# Patient Record
Sex: FEMALE | Race: WHITE | NOT HISPANIC OR LATINO | Employment: OTHER | URBAN - METROPOLITAN AREA
[De-identification: names, ages, dates, MRNs, and addresses within clinical notes are randomized per-mention and may not be internally consistent; named-entity substitution may affect disease eponyms.]

---

## 2022-05-12 ENCOUNTER — OFFICE VISIT (OUTPATIENT)
Dept: OBGYN CLINIC | Facility: CLINIC | Age: 72
End: 2022-05-12
Payer: MEDICARE

## 2022-05-12 VITALS
SYSTOLIC BLOOD PRESSURE: 104 MMHG | DIASTOLIC BLOOD PRESSURE: 64 MMHG | WEIGHT: 108 LBS | HEIGHT: 60 IN | BODY MASS INDEX: 21.2 KG/M2

## 2022-05-12 DIAGNOSIS — Z12.4 ENCOUNTER FOR PAPANICOLAOU SMEAR FOR CERVICAL CANCER SCREENING: ICD-10-CM

## 2022-05-12 DIAGNOSIS — B37.3 VAGINAL YEAST INFECTION: ICD-10-CM

## 2022-05-12 DIAGNOSIS — N89.8 VAGINAL DRYNESS: ICD-10-CM

## 2022-05-12 DIAGNOSIS — Z01.419 ENCOUNTER FOR GYNECOLOGICAL EXAMINATION WITHOUT ABNORMAL FINDING: Primary | ICD-10-CM

## 2022-05-12 DIAGNOSIS — Z12.31 ENCOUNTER FOR SCREENING MAMMOGRAM FOR BREAST CANCER: ICD-10-CM

## 2022-05-12 PROCEDURE — G0101 CA SCREEN;PELVIC/BREAST EXAM: HCPCS | Performed by: NURSE PRACTITIONER

## 2022-05-12 PROCEDURE — G0145 SCR C/V CYTO,THINLAYER,RESCR: HCPCS | Performed by: NURSE PRACTITIONER

## 2022-05-12 RX ORDER — FLUTICASONE PROPIONATE 50 MCG
SPRAY, SUSPENSION (ML) NASAL
COMMUNITY
Start: 2022-04-28

## 2022-05-12 RX ORDER — GABAPENTIN 100 MG/1
200 CAPSULE ORAL 4 TIMES DAILY
COMMUNITY
Start: 2022-04-22

## 2022-05-12 RX ORDER — FLUCONAZOLE 150 MG/1
150 TABLET ORAL ONCE
Qty: 2 TABLET | Refills: 1 | Status: SHIPPED | OUTPATIENT
Start: 2022-05-12 | End: 2022-05-12

## 2022-05-12 RX ORDER — VITAMIN B COMPLEX
1 CAPSULE ORAL DAILY
COMMUNITY

## 2022-05-12 RX ORDER — ERGOCALCIFEROL 1.25 MG/1
50000 CAPSULE ORAL
COMMUNITY

## 2022-05-12 RX ORDER — OMEPRAZOLE 40 MG/1
CAPSULE, DELAYED RELEASE ORAL
COMMUNITY
Start: 2022-05-01

## 2022-05-12 RX ORDER — MELOXICAM 15 MG/1
TABLET ORAL
COMMUNITY
Start: 2022-05-09

## 2022-05-12 RX ORDER — ZOLPIDEM TARTRATE 5 MG/1
TABLET ORAL
COMMUNITY
Start: 2022-05-02

## 2022-05-12 RX ORDER — ATORVASTATIN CALCIUM 40 MG/1
TABLET, FILM COATED ORAL
COMMUNITY
Start: 2022-03-26

## 2022-05-12 RX ORDER — DENOSUMAB 60 MG/ML
1 INJECTION SUBCUTANEOUS
COMMUNITY

## 2022-05-12 RX ORDER — POLYETHYLENE GLYCOL 400 AND PROPYLENE GLYCOL 4; 3 MG/ML; MG/ML
1 SOLUTION/ DROPS OPHTHALMIC 3 TIMES DAILY
COMMUNITY

## 2022-05-12 NOTE — PATIENT INSTRUCTIONS
Open to air at bedtime  Cotton underwear    Wear looser fitting clothing  Get out of exercise clothes and bathing suits ASAP  Avoid bathroom wipes, feminine washes/scented soaps  Wash with cetaphil cleanser  Watch sugar and carb intake  Refrain from sexual activity while on treatment      Diflucan 1 now repeat in 3 days if needed  Coconut oil as lubricant and moisturizer  Aquaphor healing ointment externally if needed  PAP done

## 2022-05-12 NOTE — PROGRESS NOTES
Assessment/Plan:  Open to air at bedtime  Cotton underwear    Wear looser fitting clothing  Get out of exercise clothes and bathing suits ASAP  Avoid bathroom wipes, feminine washes/scented soaps  Wash with cetaphil cleanser  Watch sugar and carb intake  Refrain from sexual activity while on treatment  Diflucan 1 now repeat in 3 days if needed  Coconut oil as lubricant and moisturizer  Aquaphor healing ointment externally if needed  PAP done        Diagnoses and all orders for this visit:    Encounter for gynecological examination without abnormal finding  -     Liquid-based pap, screening    Encounter for screening mammogram for breast cancer  -     Mammo screening bilateral w 3d & cad; Future    Vaginal yeast infection  -     fluconazole (DIFLUCAN) 150 mg tablet; Take 1 tablet (150 mg total) by mouth once for 1 dose Repeat in 3 days if needed    Vaginal dryness  Comments:  discussed coconut oil vs vaginal estrogen R/b discussed     Encounter for Papanicolaou smear for cervical cancer screening  -     Liquid-based pap, screening    Other orders  -     MULTIPLE VITAMIN PO; Take 1 tablet by mouth daily  -     omeprazole (PriLOSEC) 40 MG capsule  -     atorvastatin (LIPITOR) 40 mg tablet  -     Cholecalciferol 125 MCG (5000 UT) capsule; Take 1 capsule by mouth in the morning   -     B Complex CAPS; Take 1 capsule by mouth in the morning   -     gabapentin (NEURONTIN) 100 mg capsule; Take 200 mg by mouth in the morning and 200 mg at noon and 200 mg in the evening and 200 mg before bedtime  -     zolpidem (AMBIEN) 5 mg tablet; TAKE 1 TABLET BY MOUTH EVERY DAY AT BEDTIME AS NEEDED FOR 30 DAYS  -     polyethylene glycol-propylene glycol (Systane) 0 4-0 3 %; Apply 1 drop to eye Three times a day  -     meloxicam (MOBIC) 15 mg tablet; TAKE 1 TABLET ORALLY EVERY DAY TAKE WITH FOOD  -     fluticasone (FLONASE) 50 mcg/act nasal spray; USE 2 SPRAYS NASALLY ONCE A DAY 90  -     denosumab (Prolia) 60 mg/mL;  Inject 1 mg under the skin  -     ergocalciferol (VITAMIN D2) 50,000 units; Take 50,000 Units by mouth          Subjective:      Patient ID: Gilberto Mazariegos is a 70 y o  female   here for annual gyn  Doing well No Concerns PAP  neg/neg No H/o abn pap FH colon cancer FH breast cancer Denies vaginal bleeding Does note vaginal dryness  Vaginal irritation thought she had yeast infection used vagisil with burning then monistat and burned SA issue with dryness  Denies abdominal or pelvic pain Bowel and bladder are normal Former OR nurse Doing consulting opening [address]      The following portions of the patient's history were reviewed and updated as appropriate: allergies, current medications, past family history, past medical history, past social history, past surgical history and problem list     Review of Systems   Constitutional: Negative for fatigue and unexpected weight change  Gastrointestinal: Negative for abdominal distention, abdominal pain, constipation and diarrhea  Genitourinary: Negative for difficulty urinating, dyspareunia, dysuria, frequency, genital sores, pelvic pain, urgency, vaginal bleeding, vaginal discharge and vaginal pain  Psychiatric/Behavioral: Negative  Negative for dysphoric mood  The patient is not nervous/anxious  Objective:      /64 (BP Location: Left arm, Patient Position: Sitting, Cuff Size: Standard)   Ht 5' (1 524 m)   Wt 49 kg (108 lb)   BMI 21 09 kg/m²          Physical Exam  Vitals and nursing note reviewed  Constitutional:       General: She is not in acute distress  Appearance: Normal appearance  HENT:      Head: Normocephalic and atraumatic  Pulmonary:      Effort: Pulmonary effort is normal    Chest:   Breasts: Breasts are symmetrical       Right: Normal  No mass, nipple discharge, skin change, tenderness or axillary adenopathy  Left: Normal  No mass, nipple discharge, skin change, tenderness or axillary adenopathy         Abdominal: General: There is no distension  Palpations: Abdomen is soft  Tenderness: There is no abdominal tenderness  There is no guarding or rebound  Genitourinary:     General: Normal vulva  Exam position: Lithotomy position  Labia:         Right: No rash, tenderness, lesion or injury  Left: No rash, tenderness, lesion or injury  Urethra: No prolapse, urethral pain, urethral swelling or urethral lesion  Vagina: Normal  No erythema or lesions  Cervix: No cervical motion tenderness, discharge, lesion or cervical bleeding  Uterus: Normal        Adnexa: Right adnexa normal and left adnexa normal         Right: No mass or tenderness  Left: No mass or tenderness  Rectum: No mass or external hemorrhoid  Comments: PAP from cervix  Musculoskeletal:         General: Normal range of motion  Lymphadenopathy:      Upper Body:      Right upper body: No axillary adenopathy  Left upper body: No axillary adenopathy  Lower Body: No right inguinal adenopathy  No left inguinal adenopathy  Skin:     General: Skin is warm and dry  Neurological:      Mental Status: She is alert and oriented to person, place, and time  Psychiatric:         Mood and Affect: Mood normal          Behavior: Behavior normal          Thought Content:  Thought content normal          Judgment: Judgment normal

## 2022-05-20 LAB
LAB AP GYN PRIMARY INTERPRETATION: NORMAL
Lab: NORMAL

## 2024-03-01 ENCOUNTER — HOSPITAL ENCOUNTER (OUTPATIENT)
Dept: HOSPITAL 99 - HWLAB | Age: 74
End: 2024-03-01
Payer: MEDICARE

## 2024-03-01 DIAGNOSIS — M31.6: Primary | ICD-10-CM

## 2024-03-01 LAB
ALBUMIN SERPL-MCNC: 4.2 G/DL (ref 3.5–5)
ALP SERPL-CCNC: 56 U/L (ref 38–126)
ALT SERPL-CCNC: 54 U/L (ref 0–35)
AST SERPL-CCNC: 42 U/L (ref 14–36)
BUN SERPL-MCNC: 36 MG/DL (ref 7–17)
CALCIUM SERPL-MCNC: 9.4 MG/DL (ref 8.4–10.2)
CHLORIDE SERPL-SCNC: 103 MMOL/L (ref 98–107)
CO2 SERPL-SCNC: 28 MMOL/L (ref 22–30)
CRP SERPL-MCNC: 5.7 MG/L (ref 0–10)
EGFR: > 60
ERYTHROCYTE [DISTWIDTH] IN BLOOD BY AUTOMATED COUNT: 13 % (ref 11.5–14.5)
GLUCOSE SERPL-MCNC: 87 MG/DL (ref 70–99)
HCT VFR BLD AUTO: 38.5 % (ref 37–47)
HGB BLD-MCNC: 12.8 G/DL (ref 12–16)
MCHC RBC AUTO-ENTMCNC: 33.2 G/DL (ref 33–37)
MCV RBC AUTO: 90.8 FL (ref 81–99)
NRBC BLD AUTO-RTO: 0 %
PLATELET # BLD AUTO: 258 10^3/UL (ref 130–400)
POTASSIUM SERPL-SCNC: 4.9 MMOL/L (ref 3.5–5.1)
PROT SERPL-MCNC: 7.1 G/DL (ref 6.3–8.2)
SODIUM SERPL-SCNC: 139 MMOL/L (ref 135–145)

## 2024-03-11 ENCOUNTER — HOSPITAL ENCOUNTER (OUTPATIENT)
Dept: HOSPITAL 99 - HWRAD | Age: 74
End: 2024-03-11
Payer: MEDICARE

## 2024-03-11 DIAGNOSIS — R10.32: Primary | ICD-10-CM

## 2024-03-12 ENCOUNTER — PRE-ADMISSION TESTING (OUTPATIENT)
Dept: PREADMISSION TESTING | Age: 74
End: 2024-03-12
Payer: MEDICARE

## 2024-03-12 VITALS — BODY MASS INDEX: 20.81 KG/M2 | HEIGHT: 60 IN | WEIGHT: 106 LBS

## 2024-03-12 RX ORDER — DEXTROMETHORPHAN HYDROBROMIDE, GUAIFENESIN 5; 100 MG/5ML; MG/5ML
650 LIQUID ORAL EVERY 8 HOURS PRN
COMMUNITY

## 2024-03-12 RX ORDER — FLUTICASONE PROPIONATE 50 MCG
1 SPRAY, SUSPENSION (ML) NASAL EVERY MORNING
COMMUNITY

## 2024-03-12 RX ORDER — ZOLPIDEM TARTRATE 5 MG/1
5 TABLET ORAL NIGHTLY
COMMUNITY

## 2024-03-12 RX ORDER — ATORVASTATIN CALCIUM 40 MG/1
40 TABLET, FILM COATED ORAL NIGHTLY
COMMUNITY

## 2024-03-12 RX ORDER — DENOSUMAB 60 MG/ML
60 INJECTION SUBCUTANEOUS ONCE
COMMUNITY

## 2024-03-12 RX ORDER — MINOXIDIL 2.5 MG/1
1.25 TABLET ORAL NIGHTLY
COMMUNITY

## 2024-03-12 RX ORDER — OMEPRAZOLE 20 MG/1
40 CAPSULE, DELAYED RELEASE ORAL
COMMUNITY

## 2024-03-12 RX ORDER — GABAPENTIN 300 MG/1
300 CAPSULE ORAL 3 TIMES DAILY
COMMUNITY

## 2024-03-12 ASSESSMENT — PAIN SCALES - GENERAL: PAINLEVEL: 3

## 2024-03-12 NOTE — PRE-PROCEDURE INSTRUCTIONS
1.       You will be called between 1pm-5pm one business day before your procedure to tell you where and when to report. If you do not receive a phone call by 6pm, please call the Operating Room at [phone].    2.        Please report to Surgery Registration on the day of your procedure. You can park in the [facility], enter the front doors of the new Pavilion, and take the [place] elevators to the second floor. Follow signs to Surgery Registration.       3. Please follow the following fasting guidelines: Follow Dr. Merino's instructions on eating and drinking after midnight    No solid food EIGHT HOURS prior to arrival time on day of surgery.  6 ounces of clear liquids, meaning water or PLAIN black coffee WITHOUT any milk, cream, sugar, or sweetener are permitted up to TWO HOURS prior to arrival at the hospital.    4. Please take ONLY the following medications with a sip of water on the morning of your procedure: (populate names and/or NONE) gabapentin, prilosec, nasal sprays and eye drops        No NSAIDs, Supplements, Vitamins from 3/14/24 until after surgery.              May take tylenol if needed.    5. Other Instructions: You may brush your teeth the morning of the procedure. Rinse and spit, do not swallow.  Bring a list of your medications with dosages.  Use surgical wash as directed. CHG Scrub the night before and the morning of the procedure.    6. If you develop a cold, cough, fever, rash, or other symptom prior to the day of the procedure, please report it to your physician immediately.    7. If you need to cancel the procedure for any reason, please contact your physician.    8. Make arrangements to have safe transportation home accompanied by a responsible adult. If you have not arranged safe transportation home, your surgery will be cancelled. Safe transportation may include private vehicle, ride-share service, taxi and public transportation when accompanied by a responsible adult who will assist you  home. A responsible adult is someone known to you and does not include the taxi, ride-share or public transit drive transporting you.    9.  If it is medically necessary for you to have a longer stay, you will be informed as soon as the decision is made.    10. Only bring essential items to the hospital.  Do not wear or bring anything of value to the hospital including jewelry of any kind, money, or wallet. Do not wear make-up or contact lenses.  DO NOT BRING MEDICATIONS FROM HOME unless instructed to do so. DO bring your hearing aids, glasses, and a case    11. No lotion, creams, powders, or oils on skin the morning of procedure     12. Dress in comfortable clothes.    13.  If instructed, please bring a copy of your Advanced Directive (Living Will/Durable Power of ) on the day of your procedure.     14. Ensuring your safety at all times is a very important part of our [facility] Culture of Safety. After having surgery and sedation, you are at risk for falling and balance issues. Although you may feel awake, the effects of the medication can last up to 24 hours after anesthesia. If you need to use the bathroom during your recovery period, nursing staff will escort you there and stay with you to ensure your safety.    15. Refrain from drinking alcohol and smoking cigarettes for 24 hours prior to surgery.    16. Shower with antibacterial soap (Dial) the night before and morning of your procedure.  If your procedure indicates the need for CHG antiseptic wash (Bactoshield or Hibiclens), please use this instead and follow instructions as discussed at the time of your Pre-Admission Testing phone interview or visit.    Above instructions reviewed with patient and patient acknowledges understanding.    Form explained by: Niharika Goodrich RN

## 2024-03-13 ENCOUNTER — APPOINTMENT (OUTPATIENT)
Dept: LAB | Facility: HOSPITAL | Age: 74
End: 2024-03-13
Attending: ORTHOPAEDIC SURGERY
Payer: MEDICARE

## 2024-03-13 ENCOUNTER — PRE-ADMISSION TESTING (OUTPATIENT)
Dept: PREADMISSION TESTING | Facility: HOSPITAL | Age: 74
End: 2024-03-13
Attending: ORTHOPAEDIC SURGERY
Payer: MEDICARE

## 2024-03-13 ENCOUNTER — TRANSCRIBE ORDERS (OUTPATIENT)
Dept: REGISTRATION | Facility: HOSPITAL | Age: 74
End: 2024-03-13

## 2024-03-13 ENCOUNTER — HOSPITAL ENCOUNTER (OUTPATIENT)
Dept: CARDIOLOGY | Facility: HOSPITAL | Age: 74
Discharge: HOME | End: 2024-03-13
Attending: ORTHOPAEDIC SURGERY
Payer: MEDICARE

## 2024-03-13 VITALS
SYSTOLIC BLOOD PRESSURE: 120 MMHG | OXYGEN SATURATION: 98 % | BODY MASS INDEX: 20.46 KG/M2 | HEIGHT: 60 IN | DIASTOLIC BLOOD PRESSURE: 75 MMHG | TEMPERATURE: 98.1 F | RESPIRATION RATE: 18 BRPM | HEART RATE: 77 BPM | WEIGHT: 104.2 LBS

## 2024-03-13 DIAGNOSIS — M31.6 TEMPORAL ARTERITIS (CMS/HCC): ICD-10-CM

## 2024-03-13 DIAGNOSIS — T84.84XD PAIN DUE TO INTERNAL ORTHOPEDIC PROSTHETIC DEVICES, IMPLANTS AND GRAFTS, SUBSEQUENT ENCOUNTER: ICD-10-CM

## 2024-03-13 DIAGNOSIS — K21.9 GASTROESOPHAGEAL REFLUX DISEASE WITHOUT ESOPHAGITIS: ICD-10-CM

## 2024-03-13 DIAGNOSIS — M19.011 OSTEOARTHRITIS OF RIGHT SHOULDER, UNSPECIFIED OSTEOARTHRITIS TYPE: ICD-10-CM

## 2024-03-13 DIAGNOSIS — E78.49 OTHER HYPERLIPIDEMIA: ICD-10-CM

## 2024-03-13 DIAGNOSIS — Z01.818 PREOP TESTING: Primary | ICD-10-CM

## 2024-03-13 DIAGNOSIS — T84.84XD PAIN DUE TO INTERNAL ORTHOPEDIC PROSTHETIC DEVICES, IMPLANTS AND GRAFTS, SUBSEQUENT ENCOUNTER: Primary | ICD-10-CM

## 2024-03-13 LAB
ANION GAP SERPL CALC-SCNC: 7 MEQ/L (ref 3–15)
BUN SERPL-MCNC: 26 MG/DL (ref 7–25)
CALCIUM SERPL-MCNC: 8.7 MG/DL (ref 8.6–10.3)
CHLORIDE SERPL-SCNC: 105 MEQ/L (ref 98–107)
CO2 SERPL-SCNC: 27 MEQ/L (ref 21–31)
CREAT SERPL-MCNC: 0.7 MG/DL (ref 0.6–1.2)
EGFRCR SERPLBLD CKD-EPI 2021: >60 ML/MIN/1.73M*2
ERYTHROCYTE [DISTWIDTH] IN BLOOD BY AUTOMATED COUNT: 12.5 % (ref 11.7–14.4)
GLUCOSE SERPL-MCNC: 84 MG/DL (ref 70–99)
HCT VFR BLD AUTO: 39.4 % (ref 35–45)
HGB BLD-MCNC: 12.8 G/DL (ref 11.8–15.7)
MCH RBC QN AUTO: 29.8 PG (ref 28–33.2)
MCHC RBC AUTO-ENTMCNC: 32.5 G/DL (ref 32.2–35.5)
MCV RBC AUTO: 91.8 FL (ref 83–98)
PDW BLD AUTO: 9.4 FL (ref 9.4–12.3)
PLATELET # BLD AUTO: 202 K/UL (ref 150–369)
POTASSIUM SERPL-SCNC: 4 MEQ/L (ref 3.5–5.1)
RBC # BLD AUTO: 4.29 M/UL (ref 3.93–5.22)
SODIUM SERPL-SCNC: 139 MEQ/L (ref 136–145)
WBC # BLD AUTO: 6 K/UL (ref 3.8–10.5)

## 2024-03-13 PROCEDURE — 36415 COLL VENOUS BLD VENIPUNCTURE: CPT

## 2024-03-13 PROCEDURE — 99204 OFFICE O/P NEW MOD 45 MIN: CPT | Performed by: HOSPITALIST

## 2024-03-13 PROCEDURE — 80048 BASIC METABOLIC PNL TOTAL CA: CPT

## 2024-03-13 PROCEDURE — 85027 COMPLETE CBC AUTOMATED: CPT

## 2024-03-13 RX ORDER — LORAZEPAM 0.5 MG/1
0.5 TABLET ORAL EVERY 6 HOURS PRN
COMMUNITY

## 2024-03-13 RX ORDER — IBUPROFEN 200 MG
800 TABLET ORAL EVERY 6 HOURS PRN
COMMUNITY

## 2024-03-13 RX ORDER — ONDANSETRON 4 MG/1
4 TABLET, ORALLY DISINTEGRATING ORAL AS NEEDED
COMMUNITY

## 2024-03-13 RX ORDER — ACETAMINOPHEN 500 MG
5000 TABLET ORAL 3 TIMES WEEKLY
COMMUNITY

## 2024-03-13 RX ORDER — IPRATROPIUM BROMIDE 42 UG/1
2 SPRAY, METERED NASAL 4 TIMES DAILY PRN
COMMUNITY
Start: 2024-02-29

## 2024-03-13 ASSESSMENT — PAIN SCALES - GENERAL: PAINLEVEL: 5

## 2024-03-13 NOTE — H&P (VIEW-ONLY)
"   [facility] Medicine Service -  Pre-Operative Consultation       Patient Name: Fiona Lucia  Referring Surgeon: Dr. Merino    Reason for Referral: Pre-Operative Evaluation  Surgical Procedure: R shoulder revision  Operative Date: 3/21/24    PCP: Josue Pepper MD        HISTORY OF PRESENT ILLNESS      Fiona Lucia is a 73 y.o. female presenting today to the [facility] at [place] for pre-operative evaluation prior to planned surgery.    Ms. Lucia originally had her R shoulder replaced in 2022. She has since developed a \"crunching sound\" in her shoulder joint with range of motion associated with RUE numbness/tingling. She was evaluated by orthopedic surgery and is planning on revision surgery.    Overall, the patient has been feeling in good health. She denies current or recent CP, SOB, palpitations, syncope, fever, cough, abd pain, vomiting, diarrhea, BRBPR, melena, hematemesis, dysuria, hematuria, headache, vision change, vertigo, light headedness, or additional complaint.       Functionally, the patient is able to ascend a flight of stairs with no dyspnea or chest pain, and can walk at least a city block.   She denies ever having any anginal symptoms.  Functional capacity is  > 4 METS.    The patient denies, on specific questioning, the following:  No history of MI, arrhythmia, valvular heart disease or CHF.  No history of TROY.  No history of DVT/PE.  No history of COPD.  No history of CVA or TIA.  No history of DM or insulin use.   No history of CKD.   No history of liver disease or cirrhosis.  No history of bleeding disorder.  No history of difficult airway/difficult intubation.  No history of Malignant hyperthermia.  No history of adverse reaction to anesthesia in the past.      PAST MEDICAL AND SURGICAL HISTORY      Past Medical History:   Diagnosis Date   • Acute headache     hx of   • Arthritis of unspecified temporomandibular joint     hx of   • " Dry skin     hx of   • GERD (gastroesophageal reflux disease)     hx of   • Hearing decreased, bilateral     hx of   • Irritable bowel syndrome with constipation     hx of   • Lipid disorder    • Multiple skin tears     hx of   • Osteoporosis        Past Surgical History:   Procedure Laterality Date   • ABDOMINAL SURGERY     • BREAST SURGERY      lift   • EYE SURGERY     • EYE SURGERY      eye duct surgery   • FOOT FRACTURE SURGERY      hx of bunectomy   • FRACTURE SURGERY Left     wrist   • SHOULDER SURGERY Right     revision   • TEMPORAL ARTERY BIOPSY / LIGATION      hx of   • ULNAR NERVE REPAIR Right     hx of       MEDICATIONS        Current Outpatient Medications:   •  CALCIUM-MAGNESIUM-ZINC ORAL, Take 1 tablet by mouth nightly., Disp: , Rfl:   •  cholecalciferol, vitamin D3, (VITAMIN D3) 5,000 unit (125 mcg) tablet, Take 5,000 Units by mouth 3 (three) times a week (Mon, Wed, Fri). STOPPED APPROXIMATELY 02/28/2024, Disp: , Rfl:   •  FIBER, DEXTRIN, ORAL, Take by mouth nightly. 2 GUMMIES NIGHTLY, Disp: , Rfl:   •  ibuprofen (MOTRIN) 200 mg tablet, Take 800 mg by mouth every 6 (six) hours as needed., Disp: , Rfl:   •  LORazepam (ATIVAN) 0.5 mg tablet, Take 0.5 mg by mouth every 6 (six) hours as needed for anxiety. TAKES 0.5 MG-1 MG AS NEEDED, Disp: , Rfl:   •  mv,ronald,iron,mn/folic acid/chol (HAIR-SKIN-NAILS, PABA, ORAL), Take by mouth. 2 GUMMIES IN THE AM DAILY. STOPPED 03/11/2024 FOR SURGERY, Disp: , Rfl:   •  ondansetron ODT (ZOFRAN-ODT) 4 mg disintegrating tablet, Take 4 mg by mouth as needed for nausea or vomiting., Disp: , Rfl:   •  propylene glycol/peg 400/PF (SYSTANE, PF, OPHT), Administer into both eyes as needed., Disp: , Rfl:   •  acetaminophen (TYLENOL 8 HOUR) 650 mg 8 hr tablet, Take 650 mg by mouth every 8 (eight) hours as needed., Disp: , Rfl:   •  atorvastatin (LIPITOR) 40 mg tablet, Take 40 mg by mouth nightly., Disp: , Rfl:   •  denosumab (PROLIA) 60 mg/mL syringe, Inject 60 mg under the skin  once. Last dose Dec. 2023, Disp: , Rfl:   •  fluticasone propionate (FLONASE) 50 mcg/actuation nasal spray, Administer 1 spray into each nostril every morning., Disp: , Rfl:   •  gabapentin (NEURONTIN) 300 mg capsule, Take 300 mg by mouth 3 (three) times a day., Disp: , Rfl:   •  ipratropium (ATROVENT) 42 mcg (0.06 %) nasal spray, Administer 2 sprays into each nostril 4 (four) times a day as needed., Disp: , Rfl:   •  minoxidiL (LONITEN) 2.5 mg tablet, Take 1.25 mg by mouth nightly. TAKES FOR HAIR LOSS, Disp: , Rfl:   •  omeprazole (PriLOSEC) 20 mg capsule, Take 40 mg by mouth daily before breakfast., Disp: , Rfl:   •  zolpidem (AMBIEN) 5 mg tablet, Take 5 mg by mouth nightly., Disp: , Rfl:     ALLERGIES      Bitterness reducing agent [flavoring agent (bulk)] and Erythromycin    FAMILY HISTORY      family history includes Aneurysm in her biological brother; Cancer in her biological sister; Colon cancer in her biological father; Early death in her biological brother; Heart disease in her biological father and biological mother; No Known Problems in her biological sister and biological sister; Stroke in her biological mother.    SOCIAL HISTORY      Social History     Tobacco Use   • Smoking status: Never   • Smokeless tobacco: Never   Vaping Use   • Vaping Use: Never used   Substance Use Topics   • Alcohol use: Yes     Comment: occassional   • Drug use: Never       REVIEW OF SYSTEMS      All other systems reviewed and negative except as noted in HPI    PHYSICAL EXAMINATION      Visit Vitals  /75 (BP Location: Left upper arm, Patient Position: Sitting)   Pulse 77   Temp 36.7 °C (98.1 °F) (Oral)   Resp 18   Ht 1.524 m (5')   Wt 47.3 kg (104 lb 3.2 oz)   SpO2 98%   BMI 20.35 kg/m²     Body mass index is 20.35 kg/m².    Physical Exam  General: nontoxic appearing, no acute distress  HEENT: Moist membranes, PERRL, anicteric sclera   Neck: supple, no JVD  Cardiac: RRR, +S1/S2, no murmur, no rub   Lungs: clear  bilaterally, no wheezing/rales/rhonchi  Extremities: no edema, distal perfusion intact  MSK: decreased ROM R shoulder. no joint effusion or erythema  Neuro: AAOx3, nonfocal.       LABS / EKG        Labs  Today's labs reviewed.     Lab Results   Component Value Date     03/13/2024    K 4.0 03/13/2024     03/13/2024    BUN 26 (H) 03/13/2024    CREATININE 0.7 03/13/2024    WBC 6.00 03/13/2024    HGB 12.8 03/13/2024    HCT 39.4 03/13/2024     03/13/2024         ECG   Reviewed. NSR 74 bpm. Normal.    ASSESSMENT AND PLAN         Osteoarthritis of right shoulder  R shoulder revision planned for 3/21/24.  See below for statement in regards to perioperative risk.   Defer recommendations on NSAID use to the patient's surgeon.  Recommend DVT prophylaxis at the discretion of the surgeon.   Incentive spirometry and early ambulation post op.  Perioperative antibiotics defer to surgery.  Post op bowel regimen.  If present, recommend removal of riggs catheter as early as possible to prevent infection.  Monitor CBC, BMP, and volume status in the perioperative period.      Other hyperlipidemia  On statin    Gastroesophageal reflux disease without esophagitis  Continue PPI    Temporal arteritis (CMS/HCC)  Treated in 2020.  She states she has not been on chronic steroids for more than a year.         In regards to perioperative cardiac risk:  The patient denies any history of ischemic heart disease, denies any history of CHF, denies any history of CVA, is not on pre-operative treatment with insulin, and does not have a pre-operative creatinine > 2 mg/dL.   The Revised Cardiac Risk Index (RCRI) = 0 for this patient which indicates a 0.4% risk of major adverse cardiac event in the perioperative period for this intermediate risk procedure.   The patient had a stress echo this past August which was negative for ischemia or structural abnormality.    Further comments:  STOP-BANG screening for obstructive sleep apnea = 1,  which indicates the patient is low risk for having underlying TROY.          Please do not hesitate to contact [facility] during the upcoming hospitalization with any questions or concerns.     Tyler Ojeda, DO  3/14/2024

## 2024-03-13 NOTE — ASSESSMENT & PLAN NOTE
R shoulder revision planned for 3/21/24.  See below for statement in regards to perioperative risk.   Defer recommendations on NSAID use to the patient's surgeon.  Recommend DVT prophylaxis at the discretion of the surgeon.   Incentive spirometry and early ambulation post op.  Perioperative antibiotics defer to surgery.  Post op bowel regimen.  If present, recommend removal of riggs catheter as early as possible to prevent infection.  Monitor CBC, BMP, and volume status in the perioperative period.

## 2024-03-13 NOTE — PRE-PROCEDURE INSTRUCTIONS
1. You will be called between 1pm-5pm one business day before your procedure to tell you where and when to report. If you do not receive a phone call by 6pm, please call the Operating Room at [phone].    2. Please report to Surgery Registration on the day of your procedure. You can park in the [facility], enter the front doors of the new [facility], and take the [place] elevators to the second floor. Follow signs to Surgery Registration.         3. Please follow the following fasting guidelines:     No solid food EIGHT HOURS prior to arrival time on day of surgery.  6 ounces of clear liquids, meaning water or PLAIN black coffee WITHOUT any milk, cream, sugar, or sweetener are permitted up to TWO HOURS prior to arrival at the hospital.    4. Please take ONLY the following medications with a sip of water on the morning of your procedure: gabapentin and prilosec   Hold vitamins and herbals 7-1 days prior to surgery as per Dr Ojeda    5. Other Instructions: You may brush your teeth the morning of the procedure. Rinse and spit, do not swallow.  Bring a list of your medications with dosages.  Use surgical wash as directed.     6. If you develop a cold, cough, fever, rash, or other symptom prior to the day of the procedure, please report it to your physician immediately.    7. If you need to cancel the procedure for any reason, please contact your physician.    8. Make arrangements to have safe transportation home accompanied by a responsible adult. If you have not arranged safe transportation home, your surgery will be cancelled. Safe transportation may include private vehicle, ride-share service, taxi and public transportation when accompanied by a responsible adult who will assist you home. A responsible adult is someone known to you and does not include the taxi, ride-share or public transit drive transporting you.    9.  If it is medically necessary for you to have a longer stay, you will be informed as soon as  the decision is made.    10. Only bring essential items to the hospital.  Do not wear or bring anything of value to the hospital including jewelry of any kind, money, or wallet. Do not wear make-up or contact lenses.  DO NOT BRING MEDICATIONS FROM HOME unless instructed to do so. DO bring your hearing aids, glasses, and a case    11. No lotion, creams, powders, or oils on skin the morning of procedure     12. Dress in comfortable clothes.    13.  If instructed, please bring a copy of your Advanced Directive (Living Will/Durable Power of ) on the day of your procedure.     14. Ensuring your safety at all times is a very important part of our [facility] Culture of Safety. After having surgery and sedation, you are at risk for falling and balance issues. Although you may feel awake, the effects of the medication can last up to 24 hours after anesthesia. If you need to use the bathroom during your recovery period, nursing staff will escort you there and stay with you to ensure your safety.    15. Refrain from drinking alcohol and smoking cigarettes for 24 hours prior to surgery.    16. Shower with antibacterial soap (Dial) the night before and morning of your procedure.  If your procedure indicates the need for CHG antiseptic wash (Bactoshield or Hibiclens), please use this instead and follow instructions as discussed at the time of your Pre-Admission Testing phone interview or visit.  [facility]  Patient Education Preoperative Showers    Good hygiene, such as frequent handwashing and daily skin cleansing, promotes good health. Daily skin cleansing helps remove germs that may cause infections. The following instructions should be followed to help reduce germs on your skin prior to your surgical procedure.     • Bactoshield/Hibiclens CHG 4% is an antiseptic soap. The active ingredient is chlorhexidine gluconate. Do not use this product if you are allergic to chlorhexidine gluconate.  • The NIGHT before and the  MORNING of your procedure, shower or bathe with Bactoshield. This product should replace your regular soap used for cleansing most of your body surfaces. Bactoshield should not be used on your head or face; keep out of your eyes, ears and mouth.  • If you plan to wash your hair, do so with your regular shampoo. Then, rinse the hair and your body thoroughly to remove any shampoo residue.  • Wash your face with regular soap and water only.  • Wash your genital area with soap and water only.  • Thoroughly rinse your body with warm water from the neck down.  • Apply the minimum amount of Bactoshield to cover the skin. Use this product as you would any liquid soap. Leave this on for 2 minutes. NOTE- Bactoshield DOES NOT LATHER like normal soap.   • Wash the skin gently and rinse thoroughly with warm water. You do not need to scrub the skin to remove germs.  • Do not use regular soap after you have applied and rinsed the Bactoshield.  Change into clean clothes after each shower.   • Do not apply any lotions, powders, or perfumes to the body areas that have been cleansed with Bactoshield.  • No use of hair removal products or shaving at or near the surgical site 48 hours before your procedure. (72 hours for cardiac patients.)  • For those having perineal surgeries (i.e.: vaginal, rectal or cystoscopy) - please use Dial soap.  Above instructions reviewed with patient and patient acknowledges understanding.    Form explained by: Aden Beard

## 2024-03-13 NOTE — CONSULTS
"   [facility] Medicine Service -  Pre-Operative Consultation       Patient Name: Fiona Lucia  Referring Surgeon: Dr. Merino    Reason for Referral: Pre-Operative Evaluation  Surgical Procedure: R shoulder revision  Operative Date: 3/21/24    PCP: Josue Pepper MD        HISTORY OF PRESENT ILLNESS      Fiona Lucia is a 73 y.o. female presenting today to the [facility] at [place] for pre-operative evaluation prior to planned surgery.    Ms. Lucia originally had her R shoulder replaced in 2022. She has since developed a \"crunching sound\" in her shoulder joint with range of motion associated with RUE numbness/tingling. She was evaluated by orthopedic surgery and is planning on revision surgery.    Overall, the patient has been feeling in good health. She denies current or recent CP, SOB, palpitations, syncope, fever, cough, abd pain, vomiting, diarrhea, BRBPR, melena, hematemesis, dysuria, hematuria, headache, vision change, vertigo, light headedness, or additional complaint.       Functionally, the patient is able to ascend a flight of stairs with no dyspnea or chest pain, and can walk at least a city block.   She denies ever having any anginal symptoms.  Functional capacity is  > 4 METS.    The patient denies, on specific questioning, the following:  No history of MI, arrhythmia, valvular heart disease or CHF.  No history of TROY.  No history of DVT/PE.  No history of COPD.  No history of CVA or TIA.  No history of DM or insulin use.   No history of CKD.   No history of liver disease or cirrhosis.  No history of bleeding disorder.  No history of difficult airway/difficult intubation.  No history of Malignant hyperthermia.  No history of adverse reaction to anesthesia in the past.      PAST MEDICAL AND SURGICAL HISTORY      Past Medical History:   Diagnosis Date   • Acute headache     hx of   • Arthritis of unspecified temporomandibular joint     hx of   • " Dry skin     hx of   • GERD (gastroesophageal reflux disease)     hx of   • Hearing decreased, bilateral     hx of   • Irritable bowel syndrome with constipation     hx of   • Lipid disorder    • Multiple skin tears     hx of   • Osteoporosis        Past Surgical History:   Procedure Laterality Date   • ABDOMINAL SURGERY     • BREAST SURGERY      lift   • EYE SURGERY     • EYE SURGERY      eye duct surgery   • FOOT FRACTURE SURGERY      hx of bunectomy   • FRACTURE SURGERY Left     wrist   • SHOULDER SURGERY Right     revision   • TEMPORAL ARTERY BIOPSY / LIGATION      hx of   • ULNAR NERVE REPAIR Right     hx of       MEDICATIONS        Current Outpatient Medications:   •  CALCIUM-MAGNESIUM-ZINC ORAL, Take 1 tablet by mouth nightly., Disp: , Rfl:   •  cholecalciferol, vitamin D3, (VITAMIN D3) 5,000 unit (125 mcg) tablet, Take 5,000 Units by mouth 3 (three) times a week (Mon, Wed, Fri). STOPPED APPROXIMATELY 02/28/2024, Disp: , Rfl:   •  FIBER, DEXTRIN, ORAL, Take by mouth nightly. 2 GUMMIES NIGHTLY, Disp: , Rfl:   •  ibuprofen (MOTRIN) 200 mg tablet, Take 800 mg by mouth every 6 (six) hours as needed., Disp: , Rfl:   •  LORazepam (ATIVAN) 0.5 mg tablet, Take 0.5 mg by mouth every 6 (six) hours as needed for anxiety. TAKES 0.5 MG-1 MG AS NEEDED, Disp: , Rfl:   •  mv,ronald,iron,mn/folic acid/chol (HAIR-SKIN-NAILS, PABA, ORAL), Take by mouth. 2 GUMMIES IN THE AM DAILY. STOPPED 03/11/2024 FOR SURGERY, Disp: , Rfl:   •  ondansetron ODT (ZOFRAN-ODT) 4 mg disintegrating tablet, Take 4 mg by mouth as needed for nausea or vomiting., Disp: , Rfl:   •  propylene glycol/peg 400/PF (SYSTANE, PF, OPHT), Administer into both eyes as needed., Disp: , Rfl:   •  acetaminophen (TYLENOL 8 HOUR) 650 mg 8 hr tablet, Take 650 mg by mouth every 8 (eight) hours as needed., Disp: , Rfl:   •  atorvastatin (LIPITOR) 40 mg tablet, Take 40 mg by mouth nightly., Disp: , Rfl:   •  denosumab (PROLIA) 60 mg/mL syringe, Inject 60 mg under the skin  once. Last dose Dec. 2023, Disp: , Rfl:   •  fluticasone propionate (FLONASE) 50 mcg/actuation nasal spray, Administer 1 spray into each nostril every morning., Disp: , Rfl:   •  gabapentin (NEURONTIN) 300 mg capsule, Take 300 mg by mouth 3 (three) times a day., Disp: , Rfl:   •  ipratropium (ATROVENT) 42 mcg (0.06 %) nasal spray, Administer 2 sprays into each nostril 4 (four) times a day as needed., Disp: , Rfl:   •  minoxidiL (LONITEN) 2.5 mg tablet, Take 1.25 mg by mouth nightly. TAKES FOR HAIR LOSS, Disp: , Rfl:   •  omeprazole (PriLOSEC) 20 mg capsule, Take 40 mg by mouth daily before breakfast., Disp: , Rfl:   •  zolpidem (AMBIEN) 5 mg tablet, Take 5 mg by mouth nightly., Disp: , Rfl:     ALLERGIES      Bitterness reducing agent [flavoring agent (bulk)] and Erythromycin    FAMILY HISTORY      family history includes Aneurysm in her biological brother; Cancer in her biological sister; Colon cancer in her biological father; Early death in her biological brother; Heart disease in her biological father and biological mother; No Known Problems in her biological sister and biological sister; Stroke in her biological mother.    SOCIAL HISTORY      Social History     Tobacco Use   • Smoking status: Never   • Smokeless tobacco: Never   Vaping Use   • Vaping Use: Never used   Substance Use Topics   • Alcohol use: Yes     Comment: occassional   • Drug use: Never       REVIEW OF SYSTEMS      All other systems reviewed and negative except as noted in HPI    PHYSICAL EXAMINATION      Visit Vitals  /75 (BP Location: Left upper arm, Patient Position: Sitting)   Pulse 77   Temp 36.7 °C (98.1 °F) (Oral)   Resp 18   Ht 1.524 m (5')   Wt 47.3 kg (104 lb 3.2 oz)   SpO2 98%   BMI 20.35 kg/m²     Body mass index is 20.35 kg/m².    Physical Exam  General: nontoxic appearing, no acute distress  HEENT: Moist membranes, PERRL, anicteric sclera   Neck: supple, no JVD  Cardiac: RRR, +S1/S2, no murmur, no rub   Lungs: clear  bilaterally, no wheezing/rales/rhonchi  Extremities: no edema, distal perfusion intact  MSK: decreased ROM R shoulder. no joint effusion or erythema  Neuro: AAOx3, nonfocal.       LABS / EKG        Labs  Today's labs reviewed.     Lab Results   Component Value Date     03/13/2024    K 4.0 03/13/2024     03/13/2024    BUN 26 (H) 03/13/2024    CREATININE 0.7 03/13/2024    WBC 6.00 03/13/2024    HGB 12.8 03/13/2024    HCT 39.4 03/13/2024     03/13/2024         ECG   Reviewed. NSR 74 bpm. Normal.    ASSESSMENT AND PLAN         Osteoarthritis of right shoulder  R shoulder revision planned for 3/21/24.  See below for statement in regards to perioperative risk.   Defer recommendations on NSAID use to the patient's surgeon.  Recommend DVT prophylaxis at the discretion of the surgeon.   Incentive spirometry and early ambulation post op.  Perioperative antibiotics defer to surgery.  Post op bowel regimen.  If present, recommend removal of riggs catheter as early as possible to prevent infection.  Monitor CBC, BMP, and volume status in the perioperative period.      Other hyperlipidemia  On statin    Gastroesophageal reflux disease without esophagitis  Continue PPI    Temporal arteritis (CMS/HCC)  Treated in 2020.  She states she has not been on chronic steroids for more than a year.         In regards to perioperative cardiac risk:  The patient denies any history of ischemic heart disease, denies any history of CHF, denies any history of CVA, is not on pre-operative treatment with insulin, and does not have a pre-operative creatinine > 2 mg/dL.   The Revised Cardiac Risk Index (RCRI) = 0 for this patient which indicates a 0.4% risk of major adverse cardiac event in the perioperative period for this intermediate risk procedure.   The patient had a stress echo this past August which was negative for ischemia or structural abnormality.    Further comments:  STOP-BANG screening for obstructive sleep apnea = 1,  which indicates the patient is low risk for having underlying TROY.          Please do not hesitate to [place] [facility] [place] with any questions or concerns.     Tyler Ojeda, DO  3/14/2024

## 2024-03-18 NOTE — PRE-PROCEDURE INSTRUCTIONS
1. You will be called between 1pm-5pm one business day before your procedure to tell you where and when to report. If you do not receive a phone call by 6pm, please call the Operating Room at [phone].    2. Please report to Surgery Registration on the day of your procedure. You can park in the [facility], enter the front doors of the new Pavilion, and take the [facility] elevators to the second floor. Follow signs to Surgery Registration.         3. Please follow the following fasting guidelines:     No solid food EIGHT HOURS prior to arrival time on day of surgery.  6 ounces of clear liquids, meaning water or PLAIN black coffee WITHOUT any milk, cream, sugar, or sweetener are permitted up to TWO HOURS prior to arrival at the hospital.    4. Please take ONLY the following medications with a sip of water on the morning of your procedure: PER DR EASTON  GABAPENTIN AND PRILOSEC THE AM OF SURGERY.  HOLD VITAMINS AND HERBALS 7-10 DAYS PRIOR.    5. Other Instructions: You may brush your teeth the morning of the procedure. Rinse and spit, do not swallow.  Bring a list of your medications with dosages.  Use surgical wash as directed.     6. If you develop a cold, cough, fever, rash, or other symptom prior to the day of the procedure, please report it to your physician immediately.    7. If you need to cancel the procedure for any reason, please contact your physician.    8. Make arrangements to have safe transportation home accompanied by a responsible adult. If you have not arranged safe transportation home, your surgery will be cancelled. Safe transportation may include private vehicle, ride-share service, taxi and public transportation when accompanied by a responsible adult who will assist you home. A responsible adult is someone known to you and does not include the taxi, ride-share or public transit drive transporting you.    9.  If it is medically necessary for you to have a longer stay, you will be informed as  soon as the decision is made.    10. Only bring essential items to the hospital.  Do not wear or bring anything of value to the hospital including jewelry of any kind, money, or wallet. Do not wear make-up or contact lenses.  DO NOT BRING MEDICATIONS FROM HOME unless instructed to do so. DO bring your hearing aids, glasses, and a case    11. No lotion, creams, powders, or oils on skin the morning of procedure     12. Dress in comfortable clothes.    13.  If instructed, please bring a copy of your Advanced Directive (Living Will/Durable Power of ) on the day of your procedure.     14. Ensuring your safety at all times is a very important part of our [facility] Culture of Safety. After having surgery and sedation, you are at risk for falling and balance issues. Although you may feel awake, the effects of the medication can last up to 24 hours after anesthesia. If you need to use the bathroom during your recovery period, nursing staff will escort you there and stay with you to ensure your safety.    15. Refrain from drinking alcohol and smoking cigarettes for 24 hours prior to surgery.    16. [facility]  Patient Education Preoperative Showers    Good hygiene, such as frequent handwashing and daily skin cleansing, promotes good health. Daily skin cleansing helps remove germs that may cause infections. The following instructions should be followed to help reduce germs on your skin prior to your surgical procedure.     • Bactoshield/Hibiclens CHG 4% is an antiseptic soap. The active ingredient is chlorhexidine gluconate. Do not use this product if you are allergic to chlorhexidine gluconate.  • The NIGHT before and the MORNING of your procedure, shower or bathe with Bactoshield. This product should replace your regular soap used for cleansing most of your body surfaces. Bactoshield should not be used on your head or face; keep out of your eyes, ears and mouth.  • If you plan to wash your hair, do so with your  regular shampoo. Then, rinse the hair and your body thoroughly to remove any shampoo residue.  • Wash your face with regular soap and water only.  • Wash your genital area with soap and water only.  • Thoroughly rinse your body with warm water from the neck down.  • Apply the minimum amount of Bactoshield to cover the skin. Use this product as you would any liquid soap. Leave this on for 2 minutes. NOTE- Bactoshield DOES NOT LATHER like normal soap.   • Wash the skin gently and rinse thoroughly with warm water. You do not need to scrub the skin to remove germs.  • Do not use regular soap after you have applied and rinsed the Bactoshield.  Change into clean clothes after each shower.   • Do not apply any lotions, powders, or perfumes to the body areas that have been cleansed with Bactoshield.  • No use of hair removal products or shaving at or near the surgical site 48 hours before your procedure. (72 hours for cardiac patients.)  For those having perineal surgeries (i.e.: vaginal, rectal or cystoscopy) - please use Dial soap.  Above instructions reviewed with patient and patient acknowledges understanding.    Form explained by: Mahnaz Hartley RN

## 2024-03-21 ENCOUNTER — ANESTHESIA EVENT (OUTPATIENT)
Dept: OPERATING ROOM | Facility: HOSPITAL | Age: 74
Setting detail: HOSPITAL OUTPATIENT SURGERY
End: 2024-03-21
Payer: MEDICARE

## 2024-03-21 ENCOUNTER — HOSPITAL ENCOUNTER (OUTPATIENT)
Facility: HOSPITAL | Age: 74
Discharge: HOME | End: 2024-03-22
Attending: ORTHOPAEDIC SURGERY | Admitting: ORTHOPAEDIC SURGERY
Payer: MEDICARE

## 2024-03-21 ENCOUNTER — APPOINTMENT (OUTPATIENT)
Dept: RADIOLOGY | Facility: HOSPITAL | Age: 74
End: 2024-03-21
Attending: PHYSICIAN ASSISTANT
Payer: MEDICARE

## 2024-03-21 PROBLEM — Z96.611 STATUS POST REPLACEMENT OF RIGHT SHOULDER JOINT: Status: ACTIVE | Noted: 2024-03-21

## 2024-03-21 PROBLEM — G47.00 INSOMNIA: Status: ACTIVE | Noted: 2024-03-21

## 2024-03-21 PROCEDURE — 71000011 HC PACU PHASE 1 EA ADDL MIN: Performed by: ORTHOPAEDIC SURGERY

## 2024-03-21 PROCEDURE — C1713 ANCHOR/SCREW BN/BN,TIS/BN: HCPCS | Performed by: ORTHOPAEDIC SURGERY

## 2024-03-21 PROCEDURE — 63600000 HC DRUGS/DETAIL CODE: Mod: JZ | Performed by: NURSE PRACTITIONER

## 2024-03-21 PROCEDURE — 25000000 HC PHARMACY GENERAL: Performed by: NURSE ANESTHETIST, CERTIFIED REGISTERED

## 2024-03-21 PROCEDURE — 0RPJ0J6 REMOVAL OF SYNTHETIC SUBSTITUTE FROM RIGHT SHOULDER JOINT, HUMERAL SURFACE, OPEN APPROACH: ICD-10-PCS | Performed by: ORTHOPAEDIC SURGERY

## 2024-03-21 PROCEDURE — 37000001 HC ANESTHESIA GENERAL: Performed by: ORTHOPAEDIC SURGERY

## 2024-03-21 PROCEDURE — 36000005 HC OR LEVEL 5 INITIAL 30MIN: Performed by: ORTHOPAEDIC SURGERY

## 2024-03-21 PROCEDURE — 25000000 HC PHARMACY GENERAL: Performed by: NURSE PRACTITIONER

## 2024-03-21 PROCEDURE — 63600000 HC DRUGS/DETAIL CODE: Mod: JZ | Performed by: STUDENT IN AN ORGANIZED HEALTH CARE EDUCATION/TRAINING PROGRAM

## 2024-03-21 PROCEDURE — C1776 JOINT DEVICE (IMPLANTABLE): HCPCS | Performed by: ORTHOPAEDIC SURGERY

## 2024-03-21 PROCEDURE — 0RRJ00Z REPLACEMENT OF RIGHT SHOULDER JOINT WITH REVERSE BALL AND SOCKET SYNTHETIC SUBSTITUTE, OPEN APPROACH: ICD-10-PCS | Performed by: ORTHOPAEDIC SURGERY

## 2024-03-21 PROCEDURE — 25800000 HC PHARMACY IV SOLUTIONS: Performed by: PHYSICIAN ASSISTANT

## 2024-03-21 PROCEDURE — 71000001 HC PACU PHASE 1 INITIAL 30MIN: Performed by: ORTHOPAEDIC SURGERY

## 2024-03-21 PROCEDURE — 97162 PT EVAL MOD COMPLEX 30 MIN: CPT | Mod: GP

## 2024-03-21 PROCEDURE — 63600000 HC DRUGS/DETAIL CODE: Performed by: STUDENT IN AN ORGANIZED HEALTH CARE EDUCATION/TRAINING PROGRAM

## 2024-03-21 PROCEDURE — 63700000 HC SELF-ADMINISTRABLE DRUG: Performed by: NURSE PRACTITIONER

## 2024-03-21 PROCEDURE — 63700000 HC SELF-ADMINISTRABLE DRUG: Performed by: ORTHOPAEDIC SURGERY

## 2024-03-21 PROCEDURE — 27200000 HC STERILE SUPPLY: Performed by: ORTHOPAEDIC SURGERY

## 2024-03-21 PROCEDURE — 36000015 HC OR LEVEL 5 EA ADDL MIN: Performed by: ORTHOPAEDIC SURGERY

## 2024-03-21 PROCEDURE — 25800000 HC PHARMACY IV SOLUTIONS: Performed by: NURSE PRACTITIONER

## 2024-03-21 PROCEDURE — 63700000 HC SELF-ADMINISTRABLE DRUG: Performed by: PHYSICIAN ASSISTANT

## 2024-03-21 PROCEDURE — 25000000 HC PHARMACY GENERAL: Performed by: ORTHOPAEDIC SURGERY

## 2024-03-21 PROCEDURE — 73020 X-RAY EXAM OF SHOULDER: CPT | Mod: RT

## 2024-03-21 PROCEDURE — 63600000 HC DRUGS/DETAIL CODE: Mod: JZ | Performed by: NURSE ANESTHETIST, CERTIFIED REGISTERED

## 2024-03-21 PROCEDURE — 63700000 HC SELF-ADMINISTRABLE DRUG: Performed by: INTERNAL MEDICINE

## 2024-03-21 PROCEDURE — 99232 SBSQ HOSP IP/OBS MODERATE 35: CPT | Performed by: INTERNAL MEDICINE

## 2024-03-21 PROCEDURE — 63600000 HC DRUGS/DETAIL CODE: Performed by: PHYSICIAN ASSISTANT

## 2024-03-21 DEVICE — INSERT SOCKET SM 32MM NEUTRAL: Type: IMPLANTABLE DEVICE | Site: SHOULDER | Status: FUNCTIONAL

## 2024-03-21 DEVICE — RSP GLENOID HEAD W/ RETAINING SCREW 4MM SZ 32MM: Type: IMPLANTABLE DEVICE | Site: SHOULDER | Status: FUNCTIONAL

## 2024-03-21 DEVICE — SCREW 5.0 X 14MM RSP LOCKING: Type: IMPLANTABLE DEVICE | Site: SHOULDER | Status: FUNCTIONAL

## 2024-03-21 DEVICE — GLENOID BASEPLATE 30MM RSP: Type: IMPLANTABLE DEVICE | Site: SHOULDER | Status: FUNCTIONAL

## 2024-03-21 DEVICE — HUMERAL STEM SMALLSHELL 8X108MM POROUS COATED: Type: IMPLANTABLE DEVICE | Site: SHOULDER | Status: FUNCTIONAL

## 2024-03-21 DEVICE — SCREW 5.0 X 34MM RSP LOCKING: Type: IMPLANTABLE DEVICE | Site: SHOULDER | Status: FUNCTIONAL

## 2024-03-21 RX ORDER — HYDROMORPHONE HYDROCHLORIDE 1 MG/ML
0.5 INJECTION, SOLUTION INTRAMUSCULAR; INTRAVENOUS; SUBCUTANEOUS
Status: DISCONTINUED | OUTPATIENT
Start: 2024-03-21 | End: 2024-03-21 | Stop reason: HOSPADM

## 2024-03-21 RX ORDER — SODIUM CHLORIDE 0.9 G/100ML
INJECTION, SOLUTION IRRIGATION
Status: DISCONTINUED | OUTPATIENT
Start: 2024-03-21 | End: 2024-03-21 | Stop reason: HOSPADM

## 2024-03-21 RX ORDER — DEXAMETHASONE SODIUM PHOSPHATE 4 MG/ML
INJECTION, SOLUTION INTRA-ARTICULAR; INTRALESIONAL; INTRAMUSCULAR; INTRAVENOUS; SOFT TISSUE
Status: COMPLETED
Start: 2024-03-21 | End: 2024-03-21

## 2024-03-21 RX ORDER — ROPIVACAINE HYDROCHLORIDE 5 MG/ML
INJECTION, SOLUTION EPIDURAL; INFILTRATION; PERINEURAL
Status: COMPLETED | OUTPATIENT
Start: 2024-03-21 | End: 2024-03-21

## 2024-03-21 RX ORDER — IBUPROFEN 200 MG
16-32 TABLET ORAL AS NEEDED
Status: DISCONTINUED | OUTPATIENT
Start: 2024-03-21 | End: 2024-03-22 | Stop reason: HOSPADM

## 2024-03-21 RX ORDER — MORPHINE SULFATE 2 MG/ML
2 INJECTION, SOLUTION INTRAMUSCULAR; INTRAVENOUS EVERY 4 HOURS PRN
Status: DISCONTINUED | OUTPATIENT
Start: 2024-03-21 | End: 2024-03-22 | Stop reason: HOSPADM

## 2024-03-21 RX ORDER — FENTANYL CITRATE 50 UG/ML
INJECTION, SOLUTION INTRAMUSCULAR; INTRAVENOUS
Status: COMPLETED
Start: 2024-03-21 | End: 2024-03-21

## 2024-03-21 RX ORDER — ATROPINE SULFATE 0.4 MG/ML
0.2 INJECTION, SOLUTION ENDOTRACHEAL; INTRAMEDULLARY; INTRAMUSCULAR; INTRAVENOUS; SUBCUTANEOUS EVERY 5 MIN PRN
Status: DISCONTINUED | OUTPATIENT
Start: 2024-03-21 | End: 2024-03-21 | Stop reason: HOSPADM

## 2024-03-21 RX ORDER — ONDANSETRON HYDROCHLORIDE 2 MG/ML
4 INJECTION, SOLUTION INTRAVENOUS EVERY 8 HOURS PRN
Status: DISCONTINUED | OUTPATIENT
Start: 2024-03-21 | End: 2024-03-22 | Stop reason: HOSPADM

## 2024-03-21 RX ORDER — DIPHENHYDRAMINE HCL 25 MG
25 CAPSULE ORAL EVERY 6 HOURS PRN
Status: DISCONTINUED | OUTPATIENT
Start: 2024-03-21 | End: 2024-03-22 | Stop reason: HOSPADM

## 2024-03-21 RX ORDER — BISACODYL 10 MG/1
10 SUPPOSITORY RECTAL DAILY PRN
Status: DISCONTINUED | OUTPATIENT
Start: 2024-03-21 | End: 2024-03-22 | Stop reason: HOSPADM

## 2024-03-21 RX ORDER — EPHEDRINE SULFATE 50 MG/ML
INJECTION, SOLUTION INTRAVENOUS AS NEEDED
Status: DISCONTINUED | OUTPATIENT
Start: 2024-03-21 | End: 2024-03-21 | Stop reason: SURG

## 2024-03-21 RX ORDER — KETOROLAC TROMETHAMINE 15 MG/ML
15 INJECTION, SOLUTION INTRAMUSCULAR; INTRAVENOUS EVERY 6 HOURS
Status: DISCONTINUED | OUTPATIENT
Start: 2024-03-21 | End: 2024-03-22 | Stop reason: HOSPADM

## 2024-03-21 RX ORDER — LIDOCAINE HCL/PF 100 MG/5ML
SYRINGE (ML) INTRAVENOUS AS NEEDED
Status: DISCONTINUED | OUTPATIENT
Start: 2024-03-21 | End: 2024-03-21 | Stop reason: SURG

## 2024-03-21 RX ORDER — ONDANSETRON 4 MG/1
4 TABLET, ORALLY DISINTEGRATING ORAL EVERY 8 HOURS PRN
Status: DISCONTINUED | OUTPATIENT
Start: 2024-03-21 | End: 2024-03-22 | Stop reason: HOSPADM

## 2024-03-21 RX ORDER — SODIUM CHLORIDE 9 MG/ML
INJECTION, SOLUTION INTRAVENOUS CONTINUOUS
Status: DISCONTINUED | OUTPATIENT
Start: 2024-03-21 | End: 2024-03-22 | Stop reason: HOSPADM

## 2024-03-21 RX ORDER — ASPIRIN 81 MG/1
81 TABLET ORAL DAILY
Status: DISCONTINUED | OUTPATIENT
Start: 2024-03-21 | End: 2024-03-22 | Stop reason: HOSPADM

## 2024-03-21 RX ORDER — MIDAZOLAM HYDROCHLORIDE 2 MG/2ML
INJECTION, SOLUTION INTRAMUSCULAR; INTRAVENOUS
Status: COMPLETED
Start: 2024-03-21 | End: 2024-03-21

## 2024-03-21 RX ORDER — CELECOXIB 200 MG/1
200 CAPSULE ORAL ONCE
Status: COMPLETED | OUTPATIENT
Start: 2024-03-21 | End: 2024-03-21

## 2024-03-21 RX ORDER — ACETAMINOPHEN 325 MG/1
650 TABLET ORAL EVERY 4 HOURS
Status: DISCONTINUED | OUTPATIENT
Start: 2024-03-21 | End: 2024-03-22 | Stop reason: HOSPADM

## 2024-03-21 RX ORDER — PANTOPRAZOLE SODIUM 40 MG/1
40 TABLET, DELAYED RELEASE ORAL
Status: DISCONTINUED | OUTPATIENT
Start: 2024-03-22 | End: 2024-03-22 | Stop reason: HOSPADM

## 2024-03-21 RX ORDER — ROPIVACAINE HYDROCHLORIDE 5 MG/ML
INJECTION, SOLUTION EPIDURAL; INFILTRATION; PERINEURAL
Status: COMPLETED
Start: 2024-03-21 | End: 2024-03-21

## 2024-03-21 RX ORDER — DOCUSATE SODIUM 100 MG/1
100 CAPSULE, LIQUID FILLED ORAL 2 TIMES DAILY
Status: DISCONTINUED | OUTPATIENT
Start: 2024-03-21 | End: 2024-03-22 | Stop reason: HOSPADM

## 2024-03-21 RX ORDER — SODIUM CHLORIDE, SODIUM LACTATE, POTASSIUM CHLORIDE, CALCIUM CHLORIDE 600; 310; 30; 20 MG/100ML; MG/100ML; MG/100ML; MG/100ML
INJECTION, SOLUTION INTRAVENOUS CONTINUOUS
Status: ACTIVE | OUTPATIENT
Start: 2024-03-21 | End: 2024-03-21

## 2024-03-21 RX ORDER — OXYCODONE HYDROCHLORIDE 5 MG/1
5 TABLET ORAL EVERY 4 HOURS PRN
Status: DISCONTINUED | OUTPATIENT
Start: 2024-03-21 | End: 2024-03-22 | Stop reason: HOSPADM

## 2024-03-21 RX ORDER — FENTANYL CITRATE 50 UG/ML
50 INJECTION, SOLUTION INTRAMUSCULAR; INTRAVENOUS EVERY 5 MIN PRN
Status: DISCONTINUED | OUTPATIENT
Start: 2024-03-21 | End: 2024-03-21 | Stop reason: HOSPADM

## 2024-03-21 RX ORDER — FENTANYL CITRATE 50 UG/ML
INJECTION, SOLUTION INTRAMUSCULAR; INTRAVENOUS AS NEEDED
Status: DISCONTINUED | OUTPATIENT
Start: 2024-03-21 | End: 2024-03-21 | Stop reason: SURG

## 2024-03-21 RX ORDER — HYDRALAZINE HYDROCHLORIDE 20 MG/ML
5 INJECTION INTRAMUSCULAR; INTRAVENOUS
Status: DISCONTINUED | OUTPATIENT
Start: 2024-03-21 | End: 2024-03-21 | Stop reason: HOSPADM

## 2024-03-21 RX ORDER — TRANEXAMIC ACID 10 MG/ML
1000 INJECTION, SOLUTION INTRAVENOUS ONCE
Status: COMPLETED | OUTPATIENT
Start: 2024-03-21 | End: 2024-03-21

## 2024-03-21 RX ORDER — POVIDONE-IODINE 10 MG/G
OINTMENT TOPICAL
Status: DISCONTINUED | OUTPATIENT
Start: 2024-03-21 | End: 2024-03-21 | Stop reason: HOSPADM

## 2024-03-21 RX ORDER — FENTANYL CITRATE 50 UG/ML
INJECTION, SOLUTION INTRAMUSCULAR; INTRAVENOUS
Status: DISPENSED
Start: 2024-03-21 | End: 2024-03-22

## 2024-03-21 RX ORDER — GABAPENTIN 300 MG/1
300 CAPSULE ORAL 3 TIMES DAILY
Status: DISCONTINUED | OUTPATIENT
Start: 2024-03-21 | End: 2024-03-22 | Stop reason: HOSPADM

## 2024-03-21 RX ORDER — DEXTROSE 40 %
15-30 GEL (GRAM) ORAL AS NEEDED
Status: DISCONTINUED | OUTPATIENT
Start: 2024-03-21 | End: 2024-03-22 | Stop reason: HOSPADM

## 2024-03-21 RX ORDER — LORAZEPAM 0.5 MG/1
0.5 TABLET ORAL EVERY 6 HOURS PRN
Status: DISCONTINUED | OUTPATIENT
Start: 2024-03-21 | End: 2024-03-22 | Stop reason: HOSPADM

## 2024-03-21 RX ORDER — PROPOFOL 10 MG/ML
INJECTION, EMULSION INTRAVENOUS AS NEEDED
Status: DISCONTINUED | OUTPATIENT
Start: 2024-03-21 | End: 2024-03-21 | Stop reason: SURG

## 2024-03-21 RX ORDER — ATORVASTATIN CALCIUM 40 MG/1
40 TABLET, FILM COATED ORAL NIGHTLY
Status: DISCONTINUED | OUTPATIENT
Start: 2024-03-21 | End: 2024-03-22 | Stop reason: HOSPADM

## 2024-03-21 RX ORDER — FLUTICASONE PROPIONATE 50 MCG
1 SPRAY, SUSPENSION (ML) NASAL EVERY MORNING
Status: DISCONTINUED | OUTPATIENT
Start: 2024-03-21 | End: 2024-03-22 | Stop reason: HOSPADM

## 2024-03-21 RX ORDER — ONDANSETRON HYDROCHLORIDE 2 MG/ML
4 INJECTION, SOLUTION INTRAVENOUS
Status: DISCONTINUED | OUTPATIENT
Start: 2024-03-21 | End: 2024-03-21 | Stop reason: HOSPADM

## 2024-03-21 RX ORDER — ACETAMINOPHEN 325 MG/1
975 TABLET ORAL ONCE
Status: COMPLETED | OUTPATIENT
Start: 2024-03-21 | End: 2024-03-21

## 2024-03-21 RX ORDER — MIDAZOLAM HYDROCHLORIDE 2 MG/2ML
INJECTION, SOLUTION INTRAMUSCULAR; INTRAVENOUS AS NEEDED
Status: DISCONTINUED | OUTPATIENT
Start: 2024-03-21 | End: 2024-03-21 | Stop reason: SURG

## 2024-03-21 RX ORDER — OXYCODONE HYDROCHLORIDE 5 MG/1
5 TABLET ORAL ONCE AS NEEDED
Status: DISCONTINUED | OUTPATIENT
Start: 2024-03-21 | End: 2024-03-21 | Stop reason: HOSPADM

## 2024-03-21 RX ORDER — ROCURONIUM BROMIDE 10 MG/ML
INJECTION, SOLUTION INTRAVENOUS AS NEEDED
Status: DISCONTINUED | OUTPATIENT
Start: 2024-03-21 | End: 2024-03-21 | Stop reason: SURG

## 2024-03-21 RX ORDER — ONDANSETRON HYDROCHLORIDE 2 MG/ML
INJECTION, SOLUTION INTRAVENOUS
Status: DISPENSED
Start: 2024-03-21 | End: 2024-03-22

## 2024-03-21 RX ORDER — DEXAMETHASONE SODIUM PHOSPHATE 4 MG/ML
INJECTION, SOLUTION INTRA-ARTICULAR; INTRALESIONAL; INTRAMUSCULAR; INTRAVENOUS; SOFT TISSUE
Status: COMPLETED | OUTPATIENT
Start: 2024-03-21 | End: 2024-03-21

## 2024-03-21 RX ORDER — KETOROLAC TROMETHAMINE 30 MG/ML
INJECTION, SOLUTION INTRAMUSCULAR; INTRAVENOUS AS NEEDED
Status: DISCONTINUED | OUTPATIENT
Start: 2024-03-21 | End: 2024-03-21 | Stop reason: SURG

## 2024-03-21 RX ORDER — MEPERIDINE HYDROCHLORIDE 25 MG/ML
12.5 INJECTION INTRAMUSCULAR; INTRAVENOUS; SUBCUTANEOUS EVERY 10 MIN PRN
Status: DISCONTINUED | OUTPATIENT
Start: 2024-03-21 | End: 2024-03-21 | Stop reason: HOSPADM

## 2024-03-21 RX ORDER — DEXAMETHASONE SODIUM PHOSPHATE 4 MG/ML
INJECTION, SOLUTION INTRA-ARTICULAR; INTRALESIONAL; INTRAMUSCULAR; INTRAVENOUS; SOFT TISSUE AS NEEDED
Status: DISCONTINUED | OUTPATIENT
Start: 2024-03-21 | End: 2024-03-21 | Stop reason: SURG

## 2024-03-21 RX ORDER — DEXTROSE 50 % IN WATER (D50W) INTRAVENOUS SYRINGE
25 AS NEEDED
Status: DISCONTINUED | OUTPATIENT
Start: 2024-03-21 | End: 2024-03-22 | Stop reason: HOSPADM

## 2024-03-21 RX ORDER — DIPHENHYDRAMINE HCL 50 MG/ML
25 VIAL (ML) INJECTION EVERY 6 HOURS PRN
Status: DISCONTINUED | OUTPATIENT
Start: 2024-03-21 | End: 2024-03-22 | Stop reason: HOSPADM

## 2024-03-21 RX ORDER — SODIUM CHLORIDE, SODIUM LACTATE, POTASSIUM CHLORIDE, CALCIUM CHLORIDE 600; 310; 30; 20 MG/100ML; MG/100ML; MG/100ML; MG/100ML
INJECTION, SOLUTION INTRAVENOUS CONTINUOUS
Status: DISCONTINUED | OUTPATIENT
Start: 2024-03-21 | End: 2024-03-21

## 2024-03-21 RX ORDER — ALUMINUM HYDROXIDE, MAGNESIUM HYDROXIDE, AND SIMETHICONE 1200; 120; 1200 MG/30ML; MG/30ML; MG/30ML
30 SUSPENSION ORAL EVERY 4 HOURS PRN
Status: DISCONTINUED | OUTPATIENT
Start: 2024-03-21 | End: 2024-03-22 | Stop reason: HOSPADM

## 2024-03-21 RX ORDER — ZOLPIDEM TARTRATE 5 MG/1
5 TABLET ORAL NIGHTLY PRN
Status: DISCONTINUED | OUTPATIENT
Start: 2024-03-21 | End: 2024-03-22 | Stop reason: HOSPADM

## 2024-03-21 RX ORDER — LABETALOL HYDROCHLORIDE 5 MG/ML
5 INJECTION, SOLUTION INTRAVENOUS AS NEEDED
Status: DISCONTINUED | OUTPATIENT
Start: 2024-03-21 | End: 2024-03-21 | Stop reason: HOSPADM

## 2024-03-21 RX ORDER — SENNOSIDES 8.6 MG/1
1 TABLET ORAL 2 TIMES DAILY PRN
Status: DISCONTINUED | OUTPATIENT
Start: 2024-03-21 | End: 2024-03-22 | Stop reason: HOSPADM

## 2024-03-21 RX ORDER — ONDANSETRON HYDROCHLORIDE 2 MG/ML
INJECTION, SOLUTION INTRAVENOUS AS NEEDED
Status: DISCONTINUED | OUTPATIENT
Start: 2024-03-21 | End: 2024-03-21 | Stop reason: SURG

## 2024-03-21 RX ORDER — MIDAZOLAM HYDROCHLORIDE 2 MG/2ML
1 INJECTION, SOLUTION INTRAMUSCULAR; INTRAVENOUS ONCE AS NEEDED
Status: DISCONTINUED | OUTPATIENT
Start: 2024-03-21 | End: 2024-03-21 | Stop reason: HOSPADM

## 2024-03-21 RX ADMIN — ROCURONIUM BROMIDE 50 MG: 10 INJECTION, SOLUTION INTRAVENOUS at 09:58

## 2024-03-21 RX ADMIN — PROPOFOL 110 MG: 10 INJECTION, EMULSION INTRAVENOUS at 09:54

## 2024-03-21 RX ADMIN — ACETAMINOPHEN 650 MG: 325 TABLET ORAL at 16:41

## 2024-03-21 RX ADMIN — DEXAMETHASONE SODIUM PHOSPHATE 4 MG: 4 INJECTION, SOLUTION INTRAMUSCULAR; INTRAVENOUS at 09:03

## 2024-03-21 RX ADMIN — KETOROLAC TROMETHAMINE 15 MG: 15 INJECTION, SOLUTION INTRAMUSCULAR; INTRAVENOUS at 16:44

## 2024-03-21 RX ADMIN — DEXAMETHASONE SODIUM PHOSPHATE 8 MG: 4 INJECTION, SOLUTION INTRA-ARTICULAR; INTRALESIONAL; INTRAMUSCULAR; INTRAVENOUS; SOFT TISSUE at 10:24

## 2024-03-21 RX ADMIN — POVIDONE-IODINE 1 EACH: 5 SOLUTION TOPICAL at 08:38

## 2024-03-21 RX ADMIN — TRANEXAMIC ACID 1000 MG: 100 INJECTION, SOLUTION INTRAVENOUS at 09:54

## 2024-03-21 RX ADMIN — KETOROLAC TROMETHAMINE 30 MG: 30 INJECTION, SOLUTION INTRAMUSCULAR; INTRAVENOUS at 11:59

## 2024-03-21 RX ADMIN — ONDANSETRON 4 MG: 2 INJECTION INTRAMUSCULAR; INTRAVENOUS at 13:36

## 2024-03-21 RX ADMIN — CELECOXIB 200 MG: 200 CAPSULE ORAL at 08:35

## 2024-03-21 RX ADMIN — GABAPENTIN 300 MG: 300 CAPSULE ORAL at 16:42

## 2024-03-21 RX ADMIN — EPHEDRINE SULFATE 5 MG: 50 INJECTION, SOLUTION INTRAVENOUS at 11:41

## 2024-03-21 RX ADMIN — LIDOCAINE HYDROCHLORIDE 60 MG: 20 INJECTION, SOLUTION INTRAVENOUS at 09:56

## 2024-03-21 RX ADMIN — MIDAZOLAM HYDROCHLORIDE 2 MG: 1 INJECTION, SOLUTION INTRAMUSCULAR; INTRAVENOUS at 08:57

## 2024-03-21 RX ADMIN — ACETAMINOPHEN 650 MG: 325 TABLET ORAL at 20:24

## 2024-03-21 RX ADMIN — FENTANYL CITRATE 50 MCG: 50 INJECTION, SOLUTION INTRAMUSCULAR; INTRAVENOUS at 13:11

## 2024-03-21 RX ADMIN — GABAPENTIN 300 MG: 300 CAPSULE ORAL at 20:24

## 2024-03-21 RX ADMIN — SODIUM CHLORIDE, POTASSIUM CHLORIDE, SODIUM LACTATE AND CALCIUM CHLORIDE: 600; 310; 30; 20 INJECTION, SOLUTION INTRAVENOUS at 08:46

## 2024-03-21 RX ADMIN — MIDAZOLAM HYDROCHLORIDE 2 MG: 1 INJECTION, SOLUTION INTRAMUSCULAR; INTRAVENOUS at 09:43

## 2024-03-21 RX ADMIN — CEFAZOLIN 2 G: 2 INJECTION, POWDER, FOR SOLUTION INTRAMUSCULAR; INTRAVENOUS at 09:51

## 2024-03-21 RX ADMIN — SODIUM CHLORIDE: 9 INJECTION, SOLUTION INTRAVENOUS at 16:45

## 2024-03-21 RX ADMIN — SUGAMMADEX 94.6 MG: 100 INJECTION, SOLUTION INTRAVENOUS at 12:01

## 2024-03-21 RX ADMIN — ACETAMINOPHEN 975 MG: 325 TABLET ORAL at 08:34

## 2024-03-21 RX ADMIN — ZOLPIDEM TARTRATE 5 MG: 5 TABLET ORAL at 22:59

## 2024-03-21 RX ADMIN — FENTANYL CITRATE 50 MCG: 50 INJECTION, SOLUTION INTRAMUSCULAR; INTRAVENOUS at 12:46

## 2024-03-21 RX ADMIN — ONDANSETRON HYDROCHLORIDE 4 MG: 2 INJECTION, SOLUTION INTRAMUSCULAR; INTRAVENOUS at 10:23

## 2024-03-21 RX ADMIN — DOCUSATE SODIUM 100 MG: 100 CAPSULE, LIQUID FILLED ORAL at 16:42

## 2024-03-21 RX ADMIN — ROCURONIUM BROMIDE 10 MG: 10 INJECTION, SOLUTION INTRAVENOUS at 11:00

## 2024-03-21 RX ADMIN — DOCUSATE SODIUM 100 MG: 100 CAPSULE, LIQUID FILLED ORAL at 20:24

## 2024-03-21 RX ADMIN — CEFAZOLIN 2 G: 2 INJECTION, POWDER, FOR SOLUTION INTRAMUSCULAR; INTRAVENOUS at 18:08

## 2024-03-21 RX ADMIN — FENTANYL CITRATE 50 MCG: 50 INJECTION, SOLUTION INTRAMUSCULAR; INTRAVENOUS at 12:03

## 2024-03-21 RX ADMIN — VANCOMYCIN HYDROCHLORIDE 1 G: 1 INJECTION, POWDER, LYOPHILIZED, FOR SOLUTION INTRAVENOUS at 08:48

## 2024-03-21 RX ADMIN — ROPIVACAINE HYDROCHLORIDE 30 ML: 5 INJECTION, SOLUTION EPIDURAL; INFILTRATION; PERINEURAL at 09:03

## 2024-03-21 RX ADMIN — ASPIRIN 81 MG: 81 TABLET, COATED ORAL at 16:42

## 2024-03-21 RX ADMIN — FENTANYL CITRATE 100 MCG: 50 INJECTION, SOLUTION INTRAMUSCULAR; INTRAVENOUS at 08:57

## 2024-03-21 RX ADMIN — KETOROLAC TROMETHAMINE 15 MG: 15 INJECTION, SOLUTION INTRAMUSCULAR; INTRAVENOUS at 22:59

## 2024-03-21 RX ADMIN — FENTANYL CITRATE 50 MCG: 50 INJECTION, SOLUTION INTRAMUSCULAR; INTRAVENOUS at 12:30

## 2024-03-21 ASSESSMENT — COGNITIVE AND FUNCTIONAL STATUS - GENERAL
MOVING TO AND FROM BED TO CHAIR: 4 - NONE
CLIMB 3 TO 5 STEPS WITH RAILING: 3 - A LITTLE
STANDING UP FROM CHAIR USING ARMS: 4 - NONE
WALKING IN HOSPITAL ROOM: 4 - NONE
STANDING UP FROM CHAIR USING ARMS: 3 - A LITTLE
AFFECT: WFL
CLIMB 3 TO 5 STEPS WITH RAILING: 4 - NONE
WALKING IN HOSPITAL ROOM: 4 - NONE
WALKING IN HOSPITAL ROOM: 4 - NONE
MOVING TO AND FROM BED TO CHAIR: 3 - A LITTLE
STANDING UP FROM CHAIR USING ARMS: 4 - NONE
MOVING TO AND FROM BED TO CHAIR: 4 - NONE
CLIMB 3 TO 5 STEPS WITH RAILING: 4 - NONE

## 2024-03-21 NOTE — OR SURGEON
Pre-Procedure patient identification:  I am the primary operating surgeon/proceduralist and I have reviewed the applicable pathology reports and radiology studies for this procedure. I have identified the patient and confirmed laterality is right on 03/21/24 at 8:38 AM Evangelista Merino MD  Phone Number: [phone] Pre-Procedure patient identification:  I am the primary operating surgeon/proceduralist and I have reviewed the applicable pathology reports and radiology studies for this procedure. I have identified the patient on 03/21/24 at 8:38 AM Evangelista Merino MD  Phone Number: [phone]

## 2024-03-21 NOTE — ANESTHESIA PROCEDURE NOTES
Airway  Urgency: elective    Start Time: 3/21/2024 10:00 AM  Airway not difficult    General Information and Staff    Patient location during procedure: OR  Anesthesiologist: Aleja Solomon DO  Resident/CRNA: Olivia Morton CRNA  Performed by: Olivia Morton CRNA  Authorized by: Aleja Solomon DO      Indications and Patient Condition  Indications for airway management: anesthesia  Sedation level: minimal  Preoxygenated: yes  MILS maintained throughout  Mask difficulty assessment: 1 - vent by mask    Final Airway Details  Final airway type: endotracheal airway      Successful airway: ETT  Cuffed: yes   Successful intubation technique: direct laryngoscopy  Blade: Juan  Blade size: #3  ETT size (mm): 7.0  Cormack-Lehane Classification: grade III - view of epiglottis only  Placement verified by: capnometry   Measured from: lips  ETT to lips (cm): 22  Number of attempts at approach: 2  Ventilation between attempts: BVM  Number of other approaches attempted: 0  Atraumatic airway insertion

## 2024-03-21 NOTE — OP NOTE
REPORT TYPE: Operative Note    DATE OF OPERATION: 03/21/2024    PREOPERATIVE DIAGNOSIS:  Right shoulder status post total shoulder replacement with mechanical complications.    POSTOPERATIVE DIAGNOSIS:  Right shoulder status post total shoulder replacement with mechanical complications.    PROCEDURE:  1.  Revision of right reverse total shoulder replacement.  2.  Right reverse total shoulder replacement, both humeral and glenoid components.    SURGEON:  Evangelista Merino MD    ASSISTANT:  Nathalie    MEDICATIONS:    ANESTHESIA:  General by endotracheal tube with interscalene block.    ESTIMATED BLOOD LOSS:  50 mL    COMPLICATIONS:  None.    SPECIMENS:  None.    DRAINS:  None.    DISPOSITION:  The patient was brought to the recovery room in stable condition.    INDICATIONS FOR SURGERY:  The patient is a 73-year-old female who previously underwent reverse shoulder replacement.  She failed to regain functional range of motion and had consistent pain in her shoulder.  The patient had implant positioned quite high.    She was small in stature and had very large implant.  Decision was made to revise her replacement to a smaller implant in potentially a better position to give her a better functional outcome.    DESCRIPTION OF PROCEDURE:  The patient was brought to the operating room where she identified as the patient, transferred to the operating room table.  Anesthesia was then reached.  She was placed into a seated position.  The right shoulder was prepped   and draped in a sterile fashion.  An anterior incision was made on the shoulder utilizing her previous incision, carried down through the subcutaneous tissue.  I identified the cephalic vein and mobilized this with the pectoralis major.  The conjoined   tendon and coracoid were then noted and exposed identifying the deltopectoral interval.  I then carefully opened up the clavipectoral fascia up to the level of the CA ligament until we gained access under the  subacromial space and cleared the subacromial   space from adhesions.  Distally, I was able to identify the deltoid interval between the humerus and open this up as well to mobilize the deltoid completely off of the humerus.  I then tagged the remnant of the subscapularis, which was mostly deficient.    The subscapularis was then released.  Biceps was previously tenodesed.  The shoulder was then dislocated and I removed the poly tray.  The residual stem was then removed after removing the debris proximally using a small osteotome to break the bond,   and I was able to remove it with minimal bone loss and did not fracture the greater tuberosity.  I then turned my attention to the glenoid.  I split the inferior capsule, gave adequate exposure to the glenoid neck.  I then removed the glenosphere with a   Hale followed by the 4 screws peripherally and central screw.  Flexible osteotomes were then used to de-bond the back of the baseplate.  Using the extraction tool, I then was able to remove the baseplate again with minimal bone loss.  I then gave further   exposure to the glenoid.  I assessed the position of the prior implant.  It was in the top third allowing almost completely intact inferior half of the glenoid to use for revision.  A drill was then placed to allow for a low baseplate from the DJO   system.  This was followed by a tap and then the DJO baseplate with central screw was placed into good position.  I should note, I had approximately 75% bony contact.  The previous defect lined up with the most superior screw hole.  I then was able to   place 3 peripheral locking screws, anterior, posterior, inferior and used a 32, -4 glenosphere giving 6 mm lateralization.  The humerus was then trialed with the small shell 8 stem.  I was able to reduce it and give adequate soft tissue tension.  The 8   was then removed.  The final 8 stem standard identity was then impacted, small shell with a 32 neutral liner was then  placed.  I was able to range the patient's shoulder getting 130 degrees of forward elevation, abduction cleared the acromion and internal rotation was   easily past the coracoid.  I then repaired the subscapularis remnant to the anterior humerus.  The wound was irrigated and closed in multilayer fashion.  Sterile dressing was applied.  The patient was placed into an UltraSling, awoken from anesthesia   and transferred to the recovery room in stable condition.  I attest I was present for all key aspects of this procedure.  My assistant assisted in positioning, prepping, draping, retraction of soft tissue and closure.      Evangelista Merino MD    DD: 03/21/2024 17:09  DT: 03/21/2024 17:42  Voice ID: [id number]/Report ID: [id number]

## 2024-03-21 NOTE — ANESTHESIA PROCEDURE NOTES
Peripheral Block    Patient location during procedure: pre-op  Start time: 3/21/2024 8:57 AM  End time: 3/21/2024 9:03 AM  Reason for block: at surgeon's request and post-op pain management  Staffing  Performed: anesthesiologist   Anesthesiologist: Aleja Solomon DO  Performed by: Aleja Solomon DO  Authorized by: Aleja Solomon DO    Preanesthetic Checklist  Completed: patient identified, site marked, surgical consent, pre-op evaluation, timeout performed, IV checked, risks and benefits discussed, monitors and equipment checked and sterile field maintained during procedure  Peripheral Block  Patient position: supine  Prep: ChloraPrep and site prepped and draped  Patient monitoring: heart rate, cardiac monitor, continuous pulse ox and blood pressure  Block type: interscalene  Laterality: right  Injection technique: single-shot      Guidance: ultrasound guided  Image captured and stored.  Image visualization comments : Ultrasound used to visualize needle placement in appropriate tissue plane.: Ultrasound used to visualize medication disbursement around appropriate nerve structures.      Needle  Needle type: Tuohy   Needle gauge: 18 G  Needle length: 3 1/8 in  Needle localization: ultrasound guidance  Assessment  Injection assessment: negative aspiration for heme, incremental injection, no paresthesia on injection and local visualized surrounding nerve on ultrasound  Paresthesia pain: none  Heart rate change: no  Slow fractionated injection: yes  Medications Administered -   Ropivacaine PF (NAROPIN) injection 0.5 %, 30 mL  dexAMETHasone (DECADRON) injection 4 mg/mL, 4 mg

## 2024-03-21 NOTE — ANESTHESIOLOGIST PRE-PROCEDURE ATTESTATION
Pre-Procedure Patient Identification:  I am the Primary Anesthesiologist and have identified the patient on 03/21/24 at 8:32 AM.   I have confirmed the procedure(s) will be performed by the following surgeon/proceduralist Evangelista Merino MD.

## 2024-03-21 NOTE — DISCHARGE INSTRUCTIONS
Shoulder & Elbow  Dr. Evangelista Merino  Discharge Instructions: Reverse Total Shoulder Arthroplasty (Replacement)  After you are discharged from the hospital you are to remain in your sling at all times other then showering and dressing. You will remain in your sling until you are seen in the office at your first post-operative appointment (10-14 days after your surgery)  You will be discharged with pain medication take this with caution as narcotics do have side effects such as dizziness, constipation, addiction & other issues.   If you are taking the pain medication consistently make sure you are also taking a stool softener like Colace which can be purchased over the counter.   Aspirin 81 mg should be taken by mouth once daily for two weeks following your surgery. We also encourage walking to help prevent blood clots. If you were on blood thinners prior, please consult your medical provider on how & when to continue these.   A water proof bandage is placed on the shoulder, you may shower with this on. DO NOT remove the dressing. It will be removed at your first post-operative appointment.   Dressing - You may find it easiest to use this method for donning & removing shirts  Donning- Operative arm goes in first then bring the shirt around and put the good arm in second.  Removing- The good arm is removed first and then slide the shirt down the operative arm.   If you find that the hand of the operative arm is swollen, make sure that you are wiggling you fingers, opening and closing your fist and elevating it above your heart.   You are not to drive until you are out of the sling, off all narcotic pain medication and feel as though you have full control of a vehicle.                     PLEASE CALL [phone] FOR ANY PROBLEMS, INCLUDING THE FOLLOWING   EXCESSIVE REDNESS OF THE INCISION SITE   DRAINAGE FOR MORE THAN 4 DAYS   FEVER GREATER THAN 101.5F

## 2024-03-21 NOTE — PERIOPERATIVE NURSING NOTE
Sandra hardware: stem, glenosphere, liner baseplate and 5 screws explanted by the surgeon and discarded per order.

## 2024-03-21 NOTE — ANESTHESIA PREPROCEDURE EVALUATION
Relevant Problems   GASTROINTESTINAL   (+) Gastroesophageal reflux disease without esophagitis      MUSCULOSKELETAL   (+) Osteoarthritis of right shoulder       ROS/Med Hx     Past Surgical History:   Procedure Laterality Date   • ABDOMINAL SURGERY     • BREAST SURGERY      lift   • EYE SURGERY     • EYE SURGERY      eye duct surgery   • FOOT FRACTURE SURGERY      hx of bunectomy   • FRACTURE SURGERY Left     wrist   • SHOULDER SURGERY Right     revision   • TEMPORAL ARTERY BIOPSY / LIGATION      hx of   • ULNAR NERVE REPAIR Right     hx of       Physical Exam    Airway   Mallampati: II   TM distance: >3 FB   Neck ROM: full  Cardiovascular - normal   Rhythm: regular   Rate: normalPulmonary - normal   clear to auscultation  Dental - normal        Anesthesia Plan    Plan: general and regional    Technique: general endotracheal and nerve block     Lines and Monitors: PIV     Airway: natural airway / supplemental oxygen   2 ASA  Anesthetic plan and risks discussed with: patient  Induction:    intravenous   Postop Plan:   Patient Disposition: inpatient floor planned admission   Pain Management: IV analgesics

## 2024-03-21 NOTE — HOSPITAL COURSE
Fiona is a 73 y.o. female admitted on 3/21/2024 with Pain due to shoulder joint prosthesis, subsequent encounter [T84.84XD, Z96.619]  Shoulder arthritis [M19.019]. Principal problem is Osteoarthritis of right shoulder.    Past Medical History  Fiona has a past medical history of Acute headache, Arthritis of unspecified temporomandibular joint, Dry skin, GERD (gastroesophageal reflux disease), Hearing decreased, bilateral, Irritable bowel syndrome with constipation, Lipid disorder, Multiple skin tears, and Osteoporosis.    History of Present Illness   S/p R reverse TSA revision

## 2024-03-21 NOTE — PLAN OF CARE
Care Coordination Discharge Plan Summary    Admission Assessment Summary    General Information  Readmission Within the last 30 days: no previous admission in last 30 days  Does patient have a :    Patient-Specific Goals (include timeframe): To be discharged to home.    Living Arrangements  Arrived From: home  Current Living Arrangements: home  People in Home: spouse  Home Accessibility: stairs to enter home (Group), railings on inside stairs, stairs within home (Group)  Living Arrangement Comments: Patient resides with spouse in 3 , per patient, she and spouse will go to son's home at discharge, has 1 PHILLIP and then will go down a FFS to get to their living area, bedroom and full bathroom.    Social Determinants of Health - Screenings  Housing Stability  In the last 12 months, was there a time when you were not able to pay the mortgage or rent on time?: No  In the last 12 months, how many places have you lived?: 1  In the last 12 months, was there a time when you did not have a steady place to sleep or slept in a shelter (including now)?: No  Utility Access  In the past 12 months has the electric, gas, oil, or water company threatened to shut off services in your home?: No  Transportation Needs  In the past 12 months, has lack of transportation kept you from medical appointments or from getting medications?: No  In the past 12 months, has lack of transportation kept you from meetings, work, or from getting things needed for daily living?: No    Functional Status Prior to Admission  Assistive Device/Animal Currently Used at Home: none  Functional Status Comments: Reports independent with ADLs PTA.  IADL Comments: Reports independent with IADls PTA.    Discharge Needs Assessment    Concerns to be Addressed: care coordination/care conferences, discharge planning  Current Discharge Risk:    Anticipated Changes Related to Illness: inability to work    Discharge Plan Summary    Patient  Choice  Offered/Gave Vendor List: no       Concerns / Comments: Met with patient and spouse at bedside; confirmed PCP and pharmacy in close proximity to son's home where she will be going at discharge; per spouse, he will be available to assist as needed upon discharge and per patient, adult children will be bringing them meals while she is recovering; per spouse, he will transport home at DC; patient in agreement with plan for home without services.    Discharge Plan:  Disposition/Destination: Home  / Home       Connection to Community  Not applicable    Community Resources:      Discharge Transportation:  Is Out of Hospital DNR needed at Discharge: no  Does patient need discharge transport? No

## 2024-03-21 NOTE — ASSESSMENT & PLAN NOTE
Patient seen in PACU area and  Awake  Received fentanyl feeling queezy in the stomach    Reports feeling some pain in the surgical site  Continue pain management per primary team  Continue IV antibiotics per primary team  Continue IV fluids per primary team  Neurovascular checks  DVT prophylaxis per primary team  Diet per primary team  Bowel regimen  TROY protocol  Incentive spirometry  PT OT as able  We will follow a.m. labs

## 2024-03-21 NOTE — PROGRESS NOTES
S/P revision TSR  Patient evaluated at bedside  Pain well controlled  Patient denies any chest pain, SOB, N/V/D.        AAOx3, NAD  R shoulder: Dressing CDI, ice packs anteriorly, compartments soft, ROM deferred radial pulse 2+, unable to assess sensation secondary to block     A: POD #0 S/P revision R TSR    P:   d/w Attending  Pain control as needed  Continue shoulder immobilizer at all times. Pillow under elbow for support  DVT ppx  ASA 81mg PO BID  Med management: [facility]  PT/OT, [facility]  dispo planning likely home tomorrow pending clearances  Abx x 24 hrs   Post op xray reviewed by MD

## 2024-03-21 NOTE — PLAN OF CARE
Problem: Adult Inpatient Plan of Care  Goal: Plan of Care Review  Outcome: Progressing  Flowsheets (Taken 3/21/2024 1909)  Outcome Evaluation: PT eval completed  Plan of Care Reviewed With: patient

## 2024-03-21 NOTE — ASSESSMENT & PLAN NOTE
-Status post revision of right shoulder replacement by Dr. Merino on 3/21  From continue postop antibiotic regimen with Ancef per orthopedic recommendation  -Continue pain control combination of Tylenol, oxycodone and morphine  -Defer DVT prophylaxis to Orthopedic surgery on aspirin   -Add stool softeners to prevent immobility/opioid-induced constipation  -PT/OT evaluation and treatment.

## 2024-03-21 NOTE — PLAN OF CARE
Plan of Care Review  Plan of Care Reviewed With: patient  Progress: improving  Outcome Evaluation: Patient AAOx3. Does talk and smile with a slant but patient reports this is her usual. Right shoulder dressing with a scant amount of bloody drainage. Right arm numb. Jorge with Ortho asked to assess as with routine NV checks patients arm was reddenned, warm, and more swollen then upon arrival. No new orders at this time. Arm positioned on pillow, and ice applied to shoulder. 3/10 pain controlled with scheduled pain medications. Tolerating diet. OOB to bathroom, urine output as documented.  at bedside. call bell within reach. Bed alarm activated.

## 2024-03-21 NOTE — CONSULTS
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    Seen in PACU    patient reporting some pain left shoulder and received fentanyl making her stomach queezy. Denies any other symptoms of ROS.     OBJECTIVE      Vital signs in last 24 hours:  Temp:  [36.2 °C (97.2 °F)-36.3 °C (97.3 °F)] 36.2 °C (97.2 °F)  Heart Rate:  [67-91] 67  Resp:  [19-35] 19  BP: (101-142)/(57-67) 111/65    Intake/Output Summary (Last 24 hours) at 3/21/2024 1340  Last data filed at 3/21/2024 1230  Gross per 24 hour   Intake 1400 ml   Output --   Net 1400 ml       PHYSICAL EXAMINATION      Physical Exam    General exam : appears age stated, well nourished, not in distress  Head: atraumatic, normocephalic  Eyes : PERRLA, EOMI, no pallor, no icterus  ENT: no lesions, oropharynx pink, mucous membranes moist   Neck: supple, no Lymph nodes, no Thyromegaly, no JVD   CVS : normal rate, normal rhythm, S1 and S2 heard, no murmurs, rubs or gallops  Resp:normal accessory muscle usage, clear to auscultation Bilaterally  Abdomen : soft, Nt, BS +, no organomegaly   Extremities : right shoulder incision noted, no edema, no cyanosis , good peripheral pulses noted, SCDs on   MSK: + DJD, no joint swellings, no joint tenderness   Skin: intact, warm, no rash, capillary refill < 2 seconds   Neuro: AAO x3, no focal deficits.  Psych: normal mood.cooperative     LINES, CATHETERS, DRAINS, AIRWAYS, AND WOUNDS   Lines, Drains, and Airways:  Wounds (agree with documentation and present on admission):  Peripheral IV (Adult) 03/21/24 Left;Posterior Forearm (Active)   Number of days: 0       Surgical Incision Shoulder Right (Active)   Number of days: 0         Comments:      LABS / IMAGING / TELE      Labs  CBC Results       03/13/24     1409    WBC 6.00    RBC 4.29     HGB 12.8    HCT 39.4    MCV 91.8    MCH 29.8    MCHC 32.5            BMP Results       03/13/24     1409        K 4.0    Cl 105    CO2 27    Glucose 84    BUN 26    Creatinine 0.7    Calcium 8.7    Anion Gap 7    EGFR >60.0         Comment for EGFR at 1409 on 03/13/24: Calculation based on the Chronic Kidney Disease Epidemiology Collaboration (CKD-EPI) equation refit without adjustment for race.              ASSESSMENT AND PLAN      * Osteoarthritis of right shoulder  Status post replacement of right shoulder joint  Assessment & Plan  Patient seen in PACU area and  Awake  Received fentanyl feeling queezy in the stomach    Reports feeling some pain in the surgical site  Continue pain management per primary team  Continue IV antibiotics per primary team  Continue IV fluids per primary team  Neurovascular checks  DVT prophylaxis per primary team  Diet per primary team  Bowel regimen  TROY protocol  Incentive spirometry  PT OT as able  We will follow a.m. labs    Anxiety  Assessment & Plan  On lorazepam q6h prn at home  Continuing the same.    Irritable bowel syndrome with constipation  Assessment & Plan  On fiber suppmenet at night   Probiotics BID     Insomnia  Assessment & Plan  Requesting ambien at night, which is ordered    Gastroesophageal reflux disease without esophagitis  Assessment & Plan  C/w omeprazole from home regimen.    Other hyperlipidemia  Assessment & Plan  C/w statin from home         Thank you for the opportunity to participate in this patient care, will follow through the hospital course.    Victor Hugo Jansen MD  3/21/2024       Speech recognition software was used to create this note. Please disregard any inadvertent translation errors. Please call the office for any clarifications.

## 2024-03-21 NOTE — PROGRESS NOTES
Physical Therapy -  Initial Evaluation     Patient: Fiona Lucia  Location: [place] [address]  MRN: [id number]  Today's date: 3/21/2024    HISTORY OF PRESENT ILLNESS     Fiona is a 73 y.o. female admitted on 3/21/2024 with Pain due to shoulder joint prosthesis, subsequent encounter [T84.84XD, Z96.619]  Shoulder arthritis [M19.019]. Principal problem is Osteoarthritis of right shoulder.    Past Medical History  Fiona has a past medical history of Acute headache, Arthritis of unspecified temporomandibular joint, Dry skin, GERD (gastroesophageal reflux disease), Hearing decreased, bilateral, Irritable bowel syndrome with constipation, Lipid disorder, Multiple skin tears, and Osteoporosis.    History of Present Illness   S/p R reverse TSA revision    PRIOR LEVEL OF FUNCTION AND LIVING ENVIRONMENT     Prior Level of Function    Flowsheet Row Most Recent Value   Ambulation independent   Transferring independent   Toileting independent   Bathing independent   Dressing independent   Eating independent   IADLs independent   Assistive Device Currently Used at Home none        Prior Living Environment    Flowsheet Row Most Recent Value   People in Home other (see comments)   Current Living Arrangements home   Home Accessibility stairs to enter home (Group), railings on inside stairs, stairs within home (Group)   Living Environment Comment to stay at sons. 3 PHILLIP. FF with uni HR to basement bed and bath        VITALS AND PAIN        Objective   OBJECTIVE     Start time:  1845  End time:  1859  Session Length: 14 min  Mode of Treatment: physical therapy, individual therapy    General Observations  Patient received upright, in bed. She was agreeable to therapy, no issues or concerns identified by nurse prior to session. requesting to use bathroom    Precautions: shoulder, weight bearing, brace worn at all times, range of motion  Extremity Weight-Bearing Status: right upper extremity  Right UE  Weight-Bearing Status: non weight-bearing (NWB)    Limitations/Impairments: insensate body part      PT Eval and Treat - 03/21/24 1595        Cognition    Affect/Mental Status WFL        Sensory Assessment    Sensory Assessment sensation intact, lower extremities        Lower Extremity Assessment    LE Assessment ROM and Strength WFL        Bed Mobility    Bed Mobility Activities supine to sit     Neely modified independence     Safety/Cues technique;sequencing;hand placement     Assistive Device head of bed elevated     Comment OOB to L. good static sitting balance        Mobility Belt    Mobility Belt Used for All Out of Bed Activity no     Reason Mobility Belt Not Used other (see comments)     Reason Mobility Belt Not Used ind        Sit/Stand Transfer    Surface edge of bed     Neely independent     Assistive Device none     Transfer Comments slow to rise however steady upon standing        Car Transfer    Transfer Technique stand-sit;sit-stand     Neely independent     Assistive Device none     Comment cues for proper set up.        Toilet Transfer    Transfer Technique sit/stand     Neely independent     Safety/Cues proper use of assistive device;technique;sequencing     Assistive Device grab bars/safety frame     Comment heavy reliance on grab bar for support        Gait Training    Neely, Gait independent     Assistive Device none     Distance in Feet 200 feet     Pattern step-through     Comment (Gait/Stairs) cues to increase RUE awareness when turning corners, walking through thresholds        Stairs Training    Neely, Stairs independent     Safety/Cues sequencing;technique;preparatory posture     Assistive Device none     Handrail Location (Stairs) none     Number of Stairs 4     Ascending Stairs Technique step-to-step     Descending Stairs Technique step-to-step     Comment good control noted during descent        Balance    Static Sitting Balance WFL     Dynamic  Sitting Balance WFL     Sit to Stand Dynamic Balance WFL     Static Standing Balance WFL     Dynamic Standing Balance WFL                                   Session Outcome  Patient upright, in chair at end of session, chair alarm on, all needs met, call light in reach, personal items in reach. Nursing notified about patient's response to therapy/activity, patient's position, and patient's performance.    AM-PAC™ - Mobility (Current Function)     Turning form your back to your side while in flat bed without using bedrails 4 - None   Moving from lying on your back to sitting on the side of a flat bed without using bedrails 4 - None   Moving to and from a bed to a chair 4 - None   Standing up from a chair using your arms 4 - None   To walk in a hospital room 4 - None   Climbing 3-5 steps with a railing 4 - None   AM-PAC™ Mobility Score 24      ASSESSMENT AND PLAN     Progress Summary  pt ambulating independently in hallways. completing all necessary transfers, stairclimbing required for dc. no further acute goals at this time. rec dc home with assist from son    Patient/Family Therapy Goals Statement: to dc to sons    PT Plan    Flowsheet Row Most Recent Value   Therapy Frequency evaluation only at 03/21/2024 1845          PT Discharge Recommendations    Flowsheet Row Most Recent Value   PT Recommended Discharge Disposition home with assistance at 03/21/2024 1845   Anticipated Equipment Needs if Discharged Home (PT) none at 03/21/2024 1845

## 2024-03-21 NOTE — BRIEF OP NOTE
Right shoulder revision of reverse total shoulder replacement (R) Procedure Note    Procedure:    Right shoulder revision of reverse total shoulder replacement  CPT(R) Code:  30900 - SD RECONSTR TOTAL SHOULDER IMPLANT      Pre-op Diagnosis     * Pain due to shoulder joint prosthesis, subsequent encounter [T84.84XD, Z96.619]       Post-op Diagnosis     * Pain due to shoulder joint prosthesis, subsequent encounter [T84.84XD, Z96.619]    Surgeon(s) and Role:     * Evangelista Merino MD - Primary    Anesthesia: General    Staff:   Circulator: Mitzi Short RN; Fabio Hung RN  Scrub Person: Fabio Hung RN; Stefano Virk RN    Procedure Details   R revision reverse total shoulder arthroplasty  PATOS:     No    Estimated Blood Loss: No blood loss documented.    Specimens:                No specimens collected during this procedure.      Drains: * No LDAs found *    Implants:   Implant Name Type Inv. Item Serial No.  Lot No. LRB No. Used Action   GLENOID BASEPLATE 30MM RSP - [id number]  GLENOID BASEPLATE 30MM RSP  [employer] [id number] Right 1 Implanted   SCREW 5.0 X 34MM RSP LOCKING - [id number]  SCREW 5.0 X 34MM RSP LOCKING  [employer] [id number] Right 1 Implanted   SCREW 5.0 X 14MM RSP LOCKING - [id number]  SCREW 5.0 X 14MM RSP LOCKING  [employer] [id number] Right 1 Implanted   SCREW 5.0 X 14MM RSP LOCKING - [id number]  SCREW 5.0 X 14MM RSP LOCKING  [employer] [id number] Right 1 Implanted   RSP GLENOID HEAD W/ RETAINING SCREW 4MM SZ 32MM - [id number]  RSP GLENOID HEAD W/ RETAINING SCREW 4MM SZ 32MM  [employer] [id number] Right 1 Implanted   HUMERAL STEM SMALLSHELL [id number] POROUS COATED - [id number]  HUMERAL STEM SMALLSHELL [id number] POROUS COATED  [employer] [id number] Right 1 Implanted   INSERT SOCKET SM 32MM NEUTRAL - [id number]  INSERT SOCKET SM 32MM NEUTRAL  [employer] [id number] Right 1 Implanted              Complications:  None; patient tolerated the procedure well.            Disposition: PACU - hemodynamically stable.           Condition: stable    Evangelista Merino MD  Phone Number: [phone]

## 2024-03-21 NOTE — ANESTHESIA POSTPROCEDURE EVALUATION
Patient: Fiona Lucia    Procedure Summary     Date: 03/21/24 Room / Location:  [facility] OR 06 /  OR [facility]    Anesthesia Start: 0946 Anesthesia Stop: 1242    Procedure: Right shoulder revision of reverse total shoulder replacement (Right) Diagnosis:       Pain due to shoulder joint prosthesis, subsequent encounter      (painful implant)    Surgeons: Evangelista Merino MD Responsible Provider: Aleja Solomon DO    Anesthesia Type: general, regional ASA Status: 2          Anesthesia Type: general, regional  PACU Vitals  3/21/2024 1230 - 3/21/2024 1249      3/21/2024  1238             Temp: 36.2 °C (97.2 °F)            Anesthesia Post Evaluation    Pain management: adequate  Patient participation: complete - patient participated  Level of consciousness: awake and alert  Cardiovascular status: acceptable  Airway Patency: adequate  Respiratory status: acceptable  Hydration status: acceptable  Anesthetic complications: no

## 2024-03-21 NOTE — PLAN OF CARE
Care Coordination Admission Assessment Note    General Information:  Readmission Within the last 30 days: no previous admission in last 30 days  Does patient have a :    Patient-Specific Goals (include timeframe): To be discharged to home.    Living Arrangements:  Arrived From: home  Current Living Arrangements: home  People in Home: spouse  Home Accessibility: stairs to enter home (Group), railings on inside stairs, stairs within home (Group)  Living Arrangement Comments: Patient resides with spouse in 3 , per patient, she and spouse will go to son's home at discharge, has 1 PHILLIP and then will go down a FFS to get to their living area, bedroom and full bathroom.    Housing Stability and Utility Access (SDOH):  In the last 12 months, was there a time when you were not able to pay the mortgage or rent on time?: No  In the last 12 months, how many places have you lived?: 1  In the last 12 months, was there a time when you did not have a steady place to sleep or slept in a shelter (including now)?: No  In the past 12 months has the electric, gas, oil, or water company threatened to shut off services in your home?: No    Functional Status Prior to Admission:   Assistive Device/Animal Currently Used at Home: none  Functional Status Comments: Reports independent with ADLs PTA.  IADL Comments: Reports independent with IADls PTA.     Supports and Services:  Current Outpatient/Agency/Support Group: none  Type of Current Home Care Services:    History of home care episode or rehab stay:      Discharge Needs Assessment:   Concerns to be Addressed: care coordination/care conferences, discharge planning  Current Discharge Risk:    Anticipated Changes Related to Illness: inability to work    Patient/Family Anticipated Discharge Plan:  Patient/Family Anticipates Transition To: home with family  Patient/Family Anticipated Services at Transition: none    Connection to Community  Not applicable      Patient Choice:    Offered/Gave Vendor List: no  Patient's Choice of Community Agency(s):         Anticipated Discharge Plan:  Met with patient. Provided education and contact information for Care Coordination services.: yes  Anticipated Discharge Disposition: home without assistance or services     Transportation Needs (SDOH):  Transportation Concerns: none  Transportation Anticipated: family or friend will provide  Is Out of Hospital DNR needed at discharge?: no    In the past 12 months, has lack of transportation kept you from medical appointments or from getting medications?: No  In the past 12 months, has lack of transportation kept you from meetings, work, or from getting things needed for daily living?: No    Concerns - comments: Met with patient and spouse at bedside; confirmed PCP and pharmacy in close proximity to son's home where she will be going at discharge; per spouse, he will be available to assist as needed upon discharge and per patient, adult children will be bringing them meals while she is recovering; per spouse, he will transport home at DC; patient in agreement with plan for home without services.

## 2024-03-22 VITALS
TEMPERATURE: 97.6 F | OXYGEN SATURATION: 100 % | WEIGHT: 106 LBS | HEART RATE: 85 BPM | SYSTOLIC BLOOD PRESSURE: 112 MMHG | BODY MASS INDEX: 20.81 KG/M2 | DIASTOLIC BLOOD PRESSURE: 65 MMHG | HEIGHT: 60 IN | RESPIRATION RATE: 16 BRPM

## 2024-03-22 LAB
ALBUMIN SERPL-MCNC: 3.9 G/DL (ref 3.5–5.7)
ALP SERPL-CCNC: 41 IU/L (ref 34–125)
ALT SERPL-CCNC: 27 IU/L (ref 7–52)
ANION GAP SERPL CALC-SCNC: 7 MEQ/L (ref 3–15)
AST SERPL-CCNC: 32 IU/L (ref 13–39)
BILIRUB DIRECT SERPL-MCNC: 0.1 MG/DL
BILIRUB SERPL-MCNC: 0.5 MG/DL (ref 0.3–1.2)
BUN SERPL-MCNC: 17 MG/DL (ref 7–25)
CALCIUM SERPL-MCNC: 8.5 MG/DL (ref 8.6–10.3)
CHLORIDE SERPL-SCNC: 107 MEQ/L (ref 98–107)
CO2 SERPL-SCNC: 26 MEQ/L (ref 21–31)
CREAT SERPL-MCNC: 0.7 MG/DL (ref 0.6–1.2)
EGFRCR SERPLBLD CKD-EPI 2021: >60 ML/MIN/1.73M*2
ERYTHROCYTE [DISTWIDTH] IN BLOOD BY AUTOMATED COUNT: 12.9 % (ref 11.7–14.4)
GLUCOSE SERPL-MCNC: 84 MG/DL (ref 70–99)
HCT VFR BLD AUTO: 36 % (ref 35–45)
HGB BLD-MCNC: 11.5 G/DL (ref 11.8–15.7)
MCH RBC QN AUTO: 29.7 PG (ref 28–33.2)
MCHC RBC AUTO-ENTMCNC: 31.9 G/DL (ref 32.2–35.5)
MCV RBC AUTO: 93 FL (ref 83–98)
PDW BLD AUTO: 9.9 FL (ref 9.4–12.3)
PLATELET # BLD AUTO: 218 K/UL (ref 150–369)
POTASSIUM SERPL-SCNC: 4 MEQ/L (ref 3.5–5.1)
PROT SERPL-MCNC: 6.1 G/DL (ref 6–8.2)
RBC # BLD AUTO: 3.87 M/UL (ref 3.93–5.22)
SODIUM SERPL-SCNC: 140 MEQ/L (ref 136–145)
WBC # BLD AUTO: 9.1 K/UL (ref 3.8–10.5)

## 2024-03-22 PROCEDURE — 36415 COLL VENOUS BLD VENIPUNCTURE: CPT | Performed by: PHYSICIAN ASSISTANT

## 2024-03-22 PROCEDURE — 97535 SELF CARE MNGMENT TRAINING: CPT | Mod: GO

## 2024-03-22 PROCEDURE — 63700000 HC SELF-ADMINISTRABLE DRUG: Performed by: PHYSICIAN ASSISTANT

## 2024-03-22 PROCEDURE — 63600000 HC DRUGS/DETAIL CODE: Mod: JZ | Performed by: PHYSICIAN ASSISTANT

## 2024-03-22 PROCEDURE — 80053 COMPREHEN METABOLIC PANEL: CPT | Performed by: PHYSICIAN ASSISTANT

## 2024-03-22 PROCEDURE — 97166 OT EVAL MOD COMPLEX 45 MIN: CPT | Mod: GO

## 2024-03-22 PROCEDURE — 82248 BILIRUBIN DIRECT: CPT | Performed by: HOSPITALIST

## 2024-03-22 PROCEDURE — 99232 SBSQ HOSP IP/OBS MODERATE 35: CPT | Performed by: HOSPITALIST

## 2024-03-22 PROCEDURE — 80048 BASIC METABOLIC PNL TOTAL CA: CPT | Performed by: PHYSICIAN ASSISTANT

## 2024-03-22 PROCEDURE — 25800000 HC PHARMACY IV SOLUTIONS: Performed by: PHYSICIAN ASSISTANT

## 2024-03-22 PROCEDURE — 85027 COMPLETE CBC AUTOMATED: CPT | Performed by: PHYSICIAN ASSISTANT

## 2024-03-22 RX ORDER — OXYCODONE HYDROCHLORIDE 5 MG/1
5 TABLET ORAL EVERY 4 HOURS PRN
Qty: 30 TABLET | Refills: 0 | Status: SHIPPED | OUTPATIENT
Start: 2024-03-22 | End: 2024-03-27

## 2024-03-22 RX ORDER — SENNOSIDES 8.6 MG/1
1 TABLET ORAL 2 TIMES DAILY PRN
Qty: 60 TABLET | Refills: 0 | Status: SHIPPED | OUTPATIENT
Start: 2024-03-22 | End: 2024-04-21

## 2024-03-22 RX ORDER — ASPIRIN 81 MG/1
81 TABLET ORAL DAILY
Qty: 30 TABLET | Refills: 0 | Status: SHIPPED | OUTPATIENT
Start: 2024-03-23 | End: 2024-04-22

## 2024-03-22 RX ADMIN — OXYCODONE HYDROCHLORIDE 5 MG: 5 TABLET ORAL at 03:15

## 2024-03-22 RX ADMIN — KETOROLAC TROMETHAMINE 15 MG: 15 INJECTION, SOLUTION INTRAMUSCULAR; INTRAVENOUS at 11:23

## 2024-03-22 RX ADMIN — DOCUSATE SODIUM 100 MG: 100 CAPSULE, LIQUID FILLED ORAL at 09:26

## 2024-03-22 RX ADMIN — ACETAMINOPHEN 650 MG: 325 TABLET ORAL at 12:04

## 2024-03-22 RX ADMIN — ACETAMINOPHEN 650 MG: 325 TABLET ORAL at 09:26

## 2024-03-22 RX ADMIN — ASPIRIN 81 MG: 81 TABLET, COATED ORAL at 09:26

## 2024-03-22 RX ADMIN — OXYCODONE HYDROCHLORIDE 5 MG: 5 TABLET ORAL at 07:50

## 2024-03-22 RX ADMIN — GABAPENTIN 300 MG: 300 CAPSULE ORAL at 09:26

## 2024-03-22 RX ADMIN — KETOROLAC TROMETHAMINE 15 MG: 15 INJECTION, SOLUTION INTRAMUSCULAR; INTRAVENOUS at 05:27

## 2024-03-22 RX ADMIN — ACETAMINOPHEN 650 MG: 325 TABLET ORAL at 05:26

## 2024-03-22 RX ADMIN — OXYCODONE HYDROCHLORIDE 5 MG: 5 TABLET ORAL at 12:04

## 2024-03-22 RX ADMIN — CEFAZOLIN 2 G: 2 INJECTION, POWDER, FOR SOLUTION INTRAMUSCULAR; INTRAVENOUS at 03:00

## 2024-03-22 ASSESSMENT — COGNITIVE AND FUNCTIONAL STATUS - GENERAL
DRESSING REGULAR LOWER BODY CLOTHING: 3 - A LITTLE
DRESSING REGULAR UPPER BODY CLOTHING: 3 - A LITTLE
HELP NEEDED FOR PERSONAL GROOMING: 4 - NONE
CLIMB 3 TO 5 STEPS WITH RAILING: 4 - NONE
HELP NEEDED FOR BATHING: 3 - A LITTLE
WALKING IN HOSPITAL ROOM: 4 - NONE
EATING MEALS: 4 - NONE
MOVING TO AND FROM BED TO CHAIR: 4 - NONE
TOILETING: 4 - NONE
AFFECT: WFL
STANDING UP FROM CHAIR USING ARMS: 4 - NONE

## 2024-03-22 NOTE — CONSULTS
Brief Nutrition Assessment Reason for consult:  [facility]      Nutrition Interventions/Recommendations:   1. Continue with regular diet as tolerated  2. Weekly weights  3. Consider daily multivitamin   4. Supplements if po intake <50%    Monitor:  1. % PO intake  2. Diet tolerance  3. Weight changes  4. Labs-replete prn  5. Skin integrity  6. Bowel function    Goals:         1. To consume and tolerate >/= 75% of estimated needs via goal diet    Clinical Course: Patient is a 73 y.o. female who was admitted on 3/21/2024 with a diagnosis of Pain due to shoulder joint prosthesis, subsequent encounter [T84.84XD, Z96.619]  Shoulder arthritis [M19.019]. POD #1 shoulder revision of right shoulder; discussed in rounds plan for d/c today.     Nutrition Focused Physical Exam:  BMI of 20.7 is low for age         Dietary Orders   (From admission, onward)             Start     Ordered    03/21/24 1338  Adult Diet RD/LDN may adjust order; Regular  Diet effective now        References:    IDDSI Diet reference   Question Answer Comment   Delegation of Authority. Diet orders written by PA/CRDylon may not be adjusted by RD/LDNs. RD/LDN may adjust order    Diet Texture Regular        03/21/24 1337              Weights (last 7 days)     Date/Time Weight    03/21/24 1500 48.1 kg (106 lb)    03/21/24 0820 47.6 kg (105 lb)        Wt Readings from Last 3 Encounters:   03/21/24 48.1 kg (106 lb)   03/13/24 47.3 kg (104 lb 3.2 oz)   03/12/24 48.1 kg (106 lb)       Reason for Assessment  Reason For Assessment: per organizational policy (MST)    MST Nutrition Screen Tool  Has patient lost weight without trying?: 1-->Yes, 2-13 lbs  Has patient been eating poorly due to decreased appetite?: 1-->Yes  MST Nutrition Screen Score: 2         Physical Findings  Last Bowel Movement: 03/22/24  Skin: surgical incision (shoulder)    Last Bowel Movement: 03/22/24    Nutrition Order  Nutrition Order: meets nutritional requirements  Nutrition Order Comments:  "regular    Anthropometrics  Height: 152.4 cm (5')           Current Weight  Weight Method: Stated  Weight: 48.1 kg (106 lb)    Ideal Body Weight (IBW)  Ideal Body Weight (IBW) (kg): 45.86  % Ideal Body Weight: 104.84       Body Mass Index (BMI)  BMI (Calculated): 20.7  BMI Assessment: BMI 18.5-24.9: normal                        Labs/Procedures/Meds  Lab Results Reviewed: reviewed    Results from last 7 days   Lab Units 03/22/24  0621   SODIUM mEQ/L 140   POTASSIUM mEQ/L 4.0   CHLORIDE mEQ/L 107   CO2 mEQ/L 26   BUN mg/dL 17   CREATININE mg/dL 0.7   GLUCOSE mg/dL 84   CALCIUM mg/dL 8.5*      Results from last 7 days   Lab Units 03/22/24  0621   ALK PHOS IU/L 41   BILIRUBIN TOTAL mg/dL 0.5   BILIRUBIN DIRECT mg/dL 0.1   ALBUMIN g/dL 3.9   ALT IU/L 27   AST IU/L 32          No results found for: \"HGBA1C\"  No results found for: \"GOMKRCYE51\"  Lab Results   Component Value Date    CALCIUM 8.5 (L) 03/22/2024     Results from last 7 days   Lab Units 03/22/24  0621   WBC K/uL 9.10   HEMOGLOBIN g/dL 11.5*   HEMATOCRIT % 36.0   PLATELETS K/uL 218                      Diagnostic Tests/Procedures  Diagnostic Test/Procedure Reviewed: reviewed    Medications  Pertinent Medications Reviewed: reviewed  • acetaminophen  650 mg oral q4h VALENTINA   • aspirin  81 mg oral Daily   • atorvastatin  40 mg oral Nightly   • docusate sodium  100 mg oral BID   • fluticasone propionate  1 spray Each Nostril q AM   • gabapentin  300 mg oral TID   • ketorolac  15 mg intravenous q6h VALENTINA   • pantoprazole  40 mg oral Daily before breakfast   • psyllium husk  1 packet oral Nightly     • sodium chloride 0.9 %   Stopped (03/22/24 1207)       Nutritional Needs Met?: Yes  Nutrition Status Classification: Mild nutritional compromise        Date: 03/22/24  Signature: Shaneka Kelly RD, LDN  "

## 2024-03-22 NOTE — PROGRESS NOTES
S/P revision TSR  Patient evaluated at bedside  Pain well controlled  Patient denies any chest pain, SOB, N/V/D.      AAOx3, NAD  R shoulder: Dressing CDI, ice packs anteriorly, compartments soft, ROM deferred radial pulse 2+, SILT, good  strength.     A: POD #1 S/P revision R TSR     P:   d/w Attending  Pain control as needed  Continue shoulder immobilizer at all times. Pillow under elbow for support  DVT ppx  ASA 81mg PO BID  Med management: [facility] [place]  PT/OT, [facility]BRUE  dispo planning likely home today

## 2024-03-22 NOTE — PROGRESS NOTES
"    SUBJECTIVE   Interval History: Patient resting bed, no acute distress.  Pain is controlled.  She does have a little bit of tingling in the right hand.  Denies any chest pain or shortness of breath.       OBJECTIVE      Vital signs in last 24 hours:  Temp:  [36.1 °C (97 °F)-36.8 °C (98.2 °F)] 36.4 °C (97.6 °F)  Heart Rate:  [65-99] 88  Resp:  [12-35] 16  BP: (100-134)/(56-74) 108/68      Intake/Output Summary (Last 24 hours) at 3/22/2024 1045  Last data filed at 3/21/2024 1545  Gross per 24 hour   Intake 500 ml   Output 400 ml   Net 100 ml         PHYSICAL EXAMINATION      General: Well appearing, NAD  HEENT: Moist membrane, PERRL, anicteric sclera   Neck: supple, no JVD  Cardiac: RRR, +S1/S2  Lungs: clear bilaterally, no wheezing/rales/rhonchi  Abdomen: soft, NT/ND, +BS, no rebound/guarding   Extremities: Right shoulder dressing in place with sling  Neuro: AAOx3, nonfocal  Skin: warm, well perfused  Psych: cooperative, appropriate      LINES, CATHETERS, DRAINS, AIRWAYS, AND WOUNDS   Lines, Drains, Airways, Wounds:  Peripheral IV (Adult) 03/21/24 Left;Posterior Forearm (Active)   Number of days: 1       Surgical Incision Shoulder Right (Active)   Number of days: 1       Comments:          LABS / IMAGING / TELE   Labs:  Sodium 140, potassium 4.0, creatinine 0.7, WBC 9.1, hemoglobin 11.5    No results found for: \"COVID19\"     Radiology:  Significant findings include: Reviewed postop shoulder x-ray  See radiology above for details  COMMENT:  Technique: Single frontal view of the right shoulder.  Comparison: None.     Status post revision of right reverse shoulder arthroplasty. Hardware appears  intact and nondisplaced. No displaced periprosthetic fracture or lucency.  Expected postoperative air in the right shoulder soft tissues.     --  IMPRESSION:  Status post right reverse shoulder arthroplasty revision.    ECG/Telemetry:  Not applicable, MedSur      ASSESSMENT AND PLAN     * Primary osteoarthritis of right " shoulder  Assessment & Plan  -Status post revision of right shoulder replacement by Dr. Merino on 3/21  From continue postop antibiotic regimen with Ancef per orthopedic recommendation  -Continue pain control combination of Tylenol, oxycodone and morphine  -Defer DVT prophylaxis to Orthopedic surgery on aspirin   -Add stool softeners to prevent immobility/opioid-induced constipation  -PT/OT evaluation and treatment.    Anxiety  Assessment & Plan  -Continue gabapentin and Ativan    Irritable bowel syndrome with constipation  Assessment & Plan  -Continue fiber supplements  -Probiotics added    Insomnia  Assessment & Plan  Requesting ambien at night, which is ordered    Gastroesophageal reflux disease without esophagitis  Assessment & Plan  -Continue Protonix    Other hyperlipidemia  Assessment & Plan  -If continues to have elevated LFTs, consider statin holiday  -Continue atorvastatin          VTE Assessment: [place] VTE Score: 3   I have reassessed and the patient's VTE risk and treatment plan is appropriate.  VTE prophylaxis: Aspirin per orthopedic recommendation    CODE STATUS: Full Code    Expected Discharge Date:  3/22/2024        Disposition Planning: Likely stable for discharge from [facility] [place] standpoint     PDMP: I have queried and reviewed the state Prescription Drug Monitoring Program [PDMP]    Discussed with Consultant: nurse, orthopedic surgery     Discussed with Family: , present at the bedside      Sam Yost DO  03/22/24 10:38 AM    Speech recognition software was used to create this note. Please disregard any inadvertent translation errors. Please call the office for any clarifications.

## 2024-03-22 NOTE — PLAN OF CARE
Problem: Self-Care Deficit  Goal: Improved Ability to Complete Activities of Daily Living  Outcome: Progressing     Problem: Mobility Impairment  Goal: Optimal Mobility  Outcome: Progressing     Problem: Pain Acute  Goal: Optimal Pain Control and Function  Outcome: Progressing     Problem: Adult Inpatient Plan of Care  Goal: Plan of Care Review  Outcome: Progressing  Flowsheets (Taken 3/22/2024 8823)  Progress: improving  Outcome Evaluation: pt stable for d.c home  Plan of Care Reviewed With: patient  Goal: Patient-Specific Goal (Individualized)  Outcome: Progressing  Goal: Absence of Hospital-Acquired Illness or Injury  Outcome: Progressing  Goal: Optimal Comfort and Wellbeing  Outcome: Progressing  Goal: Readiness for Transition of Care  Outcome: Progressing   Plan of Care Review  Plan of Care Reviewed With: patient  Progress: improving  Outcome Evaluation: pt stable for d.c home

## 2024-03-22 NOTE — PROGRESS NOTES
Occupational Therapy -  Initial Evaluation     Patient: Fiona Lucia  Location: [place] [address]  MRN: [id number]  Today's date: 3/22/2024    HISTORY OF PRESENT ILLNESS     Fiona is a 73 y.o. female admitted on 3/21/2024 with Pain due to shoulder joint prosthesis, subsequent encounter [T84.84XD, Z96.619]  Shoulder arthritis [M19.019]. Principal problem is Osteoarthritis of right shoulder.    Past Medical History  Fiona has a past medical history of Acute headache, Arthritis of unspecified temporomandibular joint, Dry skin, GERD (gastroesophageal reflux disease), Hearing decreased, bilateral, Irritable bowel syndrome with constipation, Lipid disorder, Multiple skin tears, and Osteoporosis.    History of Present Illness   S/p R reverse TSA revision    PRIOR LEVEL OF FUNCTION AND LIVING ENVIRONMENT     Prior Level of Function    Flowsheet Row Most Recent Value   Dominant Hand right   Ambulation independent   Transferring independent   Toileting independent   Bathing independent   Dressing independent   Eating independent   IADLs independent   Driving/Transportation    Communication understands/communicates without difficulty   Prior Level of Function Comment Pt reports ind with ADLs/IADLs and mobility without AD PTA, (+), works part time. Pt reports she has had ongoing issue with R shoulder since previous surgery 2 years ago experiencing significant pain limiting QoL   Assistive Device Currently Used at Home none        Prior Living Environment    Flowsheet Row Most Recent Value   People in Home other (see comments)   Current Living Arrangements home   Home Accessibility stairs to enter home (Group), railings on inside stairs, stairs within home (Group)   Living Environment Comment Pt reports she typically lives with sp in home in [facility]. Will be staying with son in [place] with PHILLIP in proximity to MD for follow up in next couple weeks. 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 Supportive spouse.        VITALS AND PAIN     OT Vitals    Date/Time Pulse HR Source SpO2 Pt Activity O2 Therapy BP BP Location BP Method Pt Position [facility]   03/22/24 0758 85 Monitor 100 % At rest None (Room air) 112/65 Left upper arm Automatic Sitting [facility]      OT Pain    Date/Time Pain Type Side/Orientation Location Rating: Rest Rating: Activity Interventions [facility]   03/22/24 0758 Pain Assessment right shoulder 4 4 diversional activity provided;position adjusted [facility]   03/22/24 0836 Pain Reassessment right shoulder 4 4 position adjusted [facility]           Objective   OBJECTIVE     Start time:  0756  End time:  0837  Session Length: 41 min  Mode of Treatment: occupational therapy, individual therapy    General Observations  Patient received upright, in chair. She was agreeable to therapy, no issues or concerns identified by nurse prior to session. Pt received sitting up in bedside chair with RN present, agreeable to therapy and requesting to use bathroom    Precautions: shoulder, weight bearing, brace worn at all times, range of motion (Per MD orders, no ROM or WB to RUE. Securechat sent by this OT to MD to clarify after pt c inconsistent report of such precautions. MD reported new immobilizer/brace to be issued to pt and no RUE ROM/WB allowed, that MD will reinforce precautions to pt)  Extremity Weight-Bearing Status: right upper extremity  Right UE Weight-Bearing Status: non weight-bearing (NWB)     Services  Do You Speak a Language Other Than English at Home?: no      OT Eval and Treat - 03/22/24 1022        Cognition    Orientation Status oriented x 4     Affect/Mental Status WFL      Follows Commands WFL     Cognitive Function WFL;safety deficit     Safety Deficit moderate deficit;safety precautions awareness   Pt with liberal movement of RUE. Pt educated on NWB and ROM precautions of RUE requiring frequent cueing t/o session to maintain    Comment, Cognition Pleasant, cooperative, verbose requiring VCs to reorient to task, safety deficits in re: following precautions        Vision Assessment/Intervention    Vision Assessment WFL        Hearing Assessment    Hearing Status WFL        Sensory Assessment    Sensory Assessment sensation intact, upper extremities   pt reports hx compression ulnar nerve distribution at RUE due to complications with previous R shoulder surgery, underwent decompression of ulnar nerve x1 year ago with minimal success, feels it has mostly resolved. general sensation intact.       Upper Extremity Assessment    UE Assessment ROM and Strength WFL except        Upper Extremity Range of Motion    Shoulder, Left (ROM) WFL     Shoulder, Right (ROM) NT due to shoulder ROM precautions        Upper Extremity Strength    Shoulder, Left (Strength) WFL     Shoulder, Right (Strength) NT due to s/p R rTSA and NWB precautions        Mobility Belt    Mobility Belt Used for All Out of Bed Activity no     Reason Mobility Belt Not Used pt ind with amb without AD        Sit/Stand Transfer    Surface chair with arm rests     [place] independent     Assistive Device none        Toilet Transfer    Transfer Technique sit/stand     [place] independent     Comment STS at standard height toilet        Shower/Tub Transfer    Comment Anticipate no difficulty with managing transfer into WIS at home due to pt ind with mobility, NT this session        Functional Mobility    Distance in room/bathroom     Functional Mobility [place] independent     Assistive Device none     Functional Mobility Comments Pt ind with amb of HHD within pt's room        Upper Body Dressing    Tasks  don;bra/undergarment;front-opening garment;other (see comments)     [place] moderate assist (50-74% patient effort)     Safety/Cues moderate;verbal cues;maintaining precautions     Position supported sitting     Adaptive Equipment other (see comments)     Comment Pt with RUE ROM precautions requiring education on U/L UE dressing techniques. Pt required VCs for threading RUE through UE garments prior to LUE. Pt attempted to don bra but was unable to tolerate strap pressing against R shoulder/incision. Pt required min A for donning front opening jacket including A for threading RUE and threading/pulling zipper. VCs for maintaining precautions during tasks. Pt educated on appropriate donning/doffing technique of RUE sling and appropriate adjustments. Per EMR and discussion with MD, pt to receive new immobilizer this AM prior to d/c and will be educated on donning/doffing.        Lower Body Dressing    Tasks don;underwear;pants/bottoms;socks     [place] minimum assist (75% or more patient effort)     Safety/Cues moderate;verbal cues;maintaining precautions     Position supported sitting     Adaptive Equipment none     Comment Pt able to perform anterior reaching towards LE to thread BLE through underwear/pants, but required VCs for avoiding use of RUE to maintain precautions as pt was observed to be holding/pulling waistband of pants with R hand. Pt able to perform clothing management in stance with LUE only without physical assist.        Grooming    Tasks washes, rinses and dries hands     [place] supervision     Position unsupported standing     Comment Pt with good balance standing sink side to wash hands. Required VCs for avoiding use/movement of RUE during task        Toileting    Tasks adjust/manage clothing;perform bladder hygiene     [place] supervision     Safety/Cues maintaining precautions     Position unsupported sitting;unsupported standing     Comment VCs for avoiding clothing  management with R hand to maintain precautions. Pt able to manage all parts without assist        Balance    Static Sitting Balance WFL     Dynamic Sitting Balance WFL     Sit to Stand Dynamic Balance WFL     Static Standing Balance WFL     Dynamic Standing Balance WFL     Comment, Balance Good balance t/o ADLs/mobility this session        Impairments/Safety Issues    Impairments Affecting Function pain;range of motion (ROM);strength     Safety Issues Affecting Function safety precaution awareness                                Education Documentation  Treatment Plan, taught by Heydeman, Giulia Nanette, OT at 3/22/2024 10:51 AM.  Learner: Patient  Readiness: Acceptance  Method: Explanation, Demonstration  Response: Verbalizes Understanding, Demonstrated Understanding, Needs Reinforcement  Comment: Educated on OT POC, safety, RUE ROM/WB precautions, U/L UE ADL techniques, d/c planning        Session Outcome  Patient upright, in chair at end of session, call light in reach, personal items in reach, all needs met. Nursing notified about patient's performance, patient's position, and patient's response to therapy/activity.    AM-PAC™ - ADL (Current Function)     Putting on/taking off regular lower body clothing 3 - A Little   Bathing 3 - A Little   Toileting 4 - None   Putting on/taking off regular upper body clothing 3 - A Little   Help for taking care of personal grooming 4 - None   Eating meals 4 - None   AM-PAC™ ADL Score 21      ASSESSMENT AND PLAN     Progress Summary  Pt is s/p R rTSA by Dr. Merino 3/21/24, NWB and no ROM to RUE. At baseline, pt is ind with ADLs/IADLs and mobility, but has history of R shoulder impairments prior to and since R TSA x2 years ago. At time of OT eval, pt able to ambulate and perform transfers at independent level. Required VCs t/o session and up to min-mod A for managing UB/LB dressing due to decreased awareness/attention to ROM/WB precautions at RUE. Pt cleared by OT for d/c home to  son's house with A PRN and follow up with outpatient OT per MD recommendations. Per MD, pt to receive and be educated on new immobilizer to RUE prior to d/c and reinforcement of precautions.    Patient/Family Therapy Goal Statement: to go home    OT Plan    Flowsheet Row Most Recent Value   Therapy Frequency evaluation only at 03/22/2024 1022          OT Discharge Recommendations    Flowsheet Row Most Recent Value   OT Recommended Discharge Disposition home with assistance, home with outpatient services  [A from adult children/spouse for ADLs PRN, A with IADLs/driving] at 03/22/2024 1022

## 2024-03-22 NOTE — PLAN OF CARE
Problem: Self-Care Deficit  Goal: Improved Ability to Complete Activities of Daily Living  Outcome: Progressing     Problem: Adult Inpatient Plan of Care  Goal: Plan of Care Review  Outcome: Progressing  Flowsheets (Taken 3/22/2024 1051)  Progress: improving  Outcome Evaluation: Pt is s/p R rTSA by Dr. Merino 3/21/24, NWB and no ROM to RUE. At baseline, pt is ind with ADLs/IADLs and mobility, but has history of R shoulder impairments prior to and since R TSA x2 years ago. At time of OT eval, pt able to ambulate and perform transfers at independent level. Required VCs t/o session and up to min-mod A for managing UB/LB dressing due to decreased awareness/attention to ROM/WB precautions at RUE. Pt cleared by OT for d/c home to son's house with A PRN and follow up with outpatient OT per MD recommendations. Per MD, pt to receive and be educated on new immobilizer to RUE prior to d/c and reinforcement of precautions.  Plan of Care Reviewed With: patient  Goal: Patient-Specific Goal (Individualized)  Outcome: Progressing  Flowsheets (Taken 3/22/2024 1051)  Patient/Family-Specific Goals (Include Timeframe): to go home today

## 2024-06-05 ENCOUNTER — HOSPITAL ENCOUNTER (OUTPATIENT)
Dept: HOSPITAL 99 - HWWDC | Age: 74
End: 2024-06-05
Payer: MEDICARE

## 2024-06-05 DIAGNOSIS — Z12.31: Primary | ICD-10-CM

## 2024-06-10 ENCOUNTER — NURSE TRIAGE (OUTPATIENT)
Age: 74
End: 2024-06-10

## 2024-06-10 ENCOUNTER — OFFICE VISIT (OUTPATIENT)
Dept: OBGYN CLINIC | Facility: CLINIC | Age: 74
End: 2024-06-10
Payer: MEDICARE

## 2024-06-10 ENCOUNTER — HOSPITAL ENCOUNTER (OUTPATIENT)
Dept: HOSPITAL 99 - HWLAB | Age: 74
End: 2024-06-10
Payer: MEDICARE

## 2024-06-10 VITALS
SYSTOLIC BLOOD PRESSURE: 106 MMHG | HEIGHT: 60 IN | DIASTOLIC BLOOD PRESSURE: 58 MMHG | BODY MASS INDEX: 21.01 KG/M2 | WEIGHT: 107 LBS

## 2024-06-10 DIAGNOSIS — R30.0 DYSURIA: Primary | ICD-10-CM

## 2024-06-10 DIAGNOSIS — D89.2: ICD-10-CM

## 2024-06-10 DIAGNOSIS — E55.9: ICD-10-CM

## 2024-06-10 DIAGNOSIS — Z79.52: ICD-10-CM

## 2024-06-10 DIAGNOSIS — N76.0 ACUTE VAGINITIS: ICD-10-CM

## 2024-06-10 DIAGNOSIS — M31.6: ICD-10-CM

## 2024-06-10 DIAGNOSIS — K29.60: ICD-10-CM

## 2024-06-10 DIAGNOSIS — E07.9: Primary | ICD-10-CM

## 2024-06-10 DIAGNOSIS — E21.5: ICD-10-CM

## 2024-06-10 LAB
ALBUMIN SERPL-MCNC: 4.2 G/DL (ref 3.5–5)
ALP SERPL-CCNC: 52 U/L (ref 38–126)
ALT SERPL-CCNC: 43 U/L (ref 0–35)
AST SERPL-CCNC: 44 U/L (ref 14–36)
BUN SERPL-MCNC: 29 MG/DL (ref 7–17)
BV WHIFF TEST VAG QL: ABNORMAL
CALCIUM SERPL-MCNC: 9.4 MG/DL (ref 8.4–10.2)
CHLORIDE SERPL-SCNC: 104 MMOL/L (ref 98–107)
CLUE CELLS SPEC QL WET PREP: ABNORMAL
CO2 SERPL-SCNC: 32 MMOL/L (ref 22–30)
CRP SERPL-MCNC: < 5 MG/L (ref 0–10)
EGFR: > 60
ERYTHROCYTE [DISTWIDTH] IN BLOOD BY AUTOMATED COUNT: 12.4 % (ref 11.5–14.5)
GLUCOSE SERPL-MCNC: 98 MG/DL (ref 70–99)
HCT VFR BLD AUTO: 37.8 % (ref 37–47)
HGB BLD-MCNC: 12.2 G/DL (ref 12–16)
MCHC RBC AUTO-ENTMCNC: 32.3 G/DL (ref 33–37)
MCV RBC AUTO: 91.3 FL (ref 81–99)
NRBC BLD AUTO-RTO: 0 %
PH SMN: 4 [PH]
PLATELET # BLD AUTO: 204 10^3/UL (ref 130–400)
POTASSIUM SERPL-SCNC: 4.7 MMOL/L (ref 3.5–5.1)
PROT SERPL-MCNC: 6.6 G/DL (ref 6.3–8.2)
SL AMB  POCT GLUCOSE, UA: NORMAL
SL AMB LEUKOCYTE ESTERASE,UA: NORMAL
SL AMB POCT BILIRUBIN,UA: NORMAL
SL AMB POCT BLOOD,UA: NORMAL
SL AMB POCT KETONES,UA: NORMAL
SL AMB POCT NITRITE,UA: NORMAL
SL AMB POCT PH,UA: 6
SL AMB POCT SPECIFIC GRAVITY,UA: 1
SL AMB POCT URINE PROTEIN: NORMAL
SL AMB POCT UROBILINOGEN: NORMAL
SL AMB POCT WET MOUNT: ABNORMAL
SODIUM SERPL-SCNC: 141 MMOL/L (ref 135–145)
T VAGINALIS VAG QL WET PREP: ABNORMAL
YEAST VAG QL WET PREP: ABNORMAL

## 2024-06-10 PROCEDURE — 87210 SMEAR WET MOUNT SALINE/INK: CPT | Performed by: OBSTETRICS & GYNECOLOGY

## 2024-06-10 PROCEDURE — 99213 OFFICE O/P EST LOW 20 MIN: CPT | Performed by: OBSTETRICS & GYNECOLOGY

## 2024-06-10 PROCEDURE — 81002 URINALYSIS NONAUTO W/O SCOPE: CPT | Performed by: OBSTETRICS & GYNECOLOGY

## 2024-06-10 RX ORDER — FLUCONAZOLE 150 MG/1
150 TABLET ORAL DAILY
Qty: 2 TABLET | Refills: 1 | Status: SHIPPED | OUTPATIENT
Start: 2024-06-10 | End: 2024-06-12

## 2024-06-10 RX ORDER — MINOXIDIL 2.5 MG/1
TABLET ORAL
COMMUNITY

## 2024-06-10 NOTE — PATIENT INSTRUCTIONS
Vaginitis   AMBULATORY CARE:   Vaginitis  is an inflammation or infection of your vagina. Vaginitis is commonly caused by a bacterial or fungal infection. Other causes include a foreign object or exposure to a chemical irritant. You may be given medicines to treat an infection caused by bacteria or a fungus. Medicines may be given as a pill, or as a cream, gel, or tablet you insert into your vagina.  Common signs and symptoms:   Pain, itching, redness, burning, or swelling in your vagina    An odor from your vagina that may be foul or smell like fish    Thick, curd-like discharge    Thin, gray-white discharge    Small skin tears or chafing    Painful sexual intercourse    Pain when you urinate    Call your doctor if:   You have unusual vaginal bleeding.    You have severe abdominal pain.    You have a fever.    Your symptoms get worse, even after treatment.    Your symptoms return.    You have questions or concerns about your condition or care.    Manage your symptoms:   Take a sitz bath.  Fill a bathtub with 4 to 6 inches of warm water. You may also use a sitz bath pan that fits inside a toilet bowl. Sit in the sitz bath for 15 minutes. Do this 3 times a day, and after each bowel movement. The warm water can help decrease pain and swelling.    Use over-the-counter creams or ointments as directed.  Examples include petroleum jelly, zinc creams, or hydrocortisone creams. These will help decrease itching and inflammation.    Do not wear tight-fitting clothes or undergarments.  These can make your symptoms worse.    Do not use douches other irritating products in your vagina.  Examples include bubble baths and perfumed soaps. The vagina is delicate and easily irritated. Ask your healthcare provider if it is okay to use tampons during your monthly periods. You may need to use pads instead until your symptoms go away.    Do not have sex until your symptoms go away.  When you have sex, always use a condom. Condoms can  help protect you from contact with fluids from your partner that may be causing your vaginitis.    Prevent infection:   Wash your hands  with soap and water after you use the toilet.         Wipe from front to back.  Do this after you urinate or have a bowel movement. This will prevent germs from getting into your vagina.    Wash your vagina each day.  Use mild, unscented soap. Let the area air dry.    Follow up with your doctor as directed:  You may need to return within 3 months for more testing to make sure the infection has not returned. Write down your questions so you remember to ask them during your visits.   © Copyright [employer] 2023 Information is for End User's use only and may not be sold, redistributed or otherwise used for commercial purposes.  The above information is an  only. It is not intended as medical advice for individual conditions or treatments. Talk to your doctor, nurse or pharmacist before following any medical regimen to see if it is safe and effective for you.

## 2024-06-10 NOTE — TELEPHONE ENCOUNTER
"Patient calling with urinary frequency and vaginal burning since Wednesday of last week. States vaginal burning has become worse and currently at a 6/10. Asking to be seen. Denies cloudy urine, vaginal bleeding, fever. RN able to get patient an appointment for this afternoon for further evaluation of symptoms. Pt agreeable to plan. No further questions.     Reason for Disposition   Patient wants to be seen    Answer Assessment - Initial Assessment Questions  1. SYMPTOM: \"What's the main symptom you're concerned about?\" (e.g., frequency, incontinence)      Frequency, burning in vagina  2. ONSET: \"When did the  s/s  start?\"      Last Wednesday  3. PAIN: \"Is there any pain?\" If Yes, ask: \"How bad is it?\" (Scale: 1-10; mild, moderate, severe)      6/10  4. CAUSE: \"What do you think is causing the symptoms?\"      Unsure  5. OTHER SYMPTOMS: \"Do you have any other symptoms?\" (e.g., fever, flank pain, blood in urine, pain with urination)      Denies  6. PREGNANCY: \"Is there any chance you are pregnant?\" \"When was your last menstrual period?\"      NA    Protocols used: Urinary Symptoms-ADULT-[place]    "

## 2024-06-10 NOTE — PROGRESS NOTES
PROBLEM GYNECOLOGICAL VISIT    Maria T Alaniz is a 73 y.o. female who presents today with complaint of vagianl itching and  burning not sure about  UTI.   No fevers or chills  Her general medical history has been reviewed and she reports it as follows:    Past Medical History:   Diagnosis Date   • Shingles      Past Surgical History:   Procedure Laterality Date   • ELBOW SURGERY Right     2023   • SHOULDER SURGERY Right     twice     OB History        4    Para   4    Term                AB        Living   4       SAB        IAB        Ectopic        Multiple        Live Births                   Social History     Tobacco Use   • Smoking status: Never   • Smokeless tobacco: Never   Vaping Use   • Vaping status: Never Used   Substance Use Topics   • Alcohol use: Not Currently   • Drug use: Never       Current Outpatient Medications   Medication Instructions   • atorvastatin (LIPITOR) 40 mg tablet No dose, route, or frequency recorded.   • B Complex CAPS 1 capsule, Daily   • Cholecalciferol 125 MCG (5000 UT) capsule 1 capsule, Oral, Daily   • ergocalciferol (VITAMIN D2) 50,000 Units, Oral   • fluconazole (DIFLUCAN) 150 mg, Oral, Daily   • fluticasone (FLONASE) 50 mcg/act nasal spray USE 2 SPRAYS NASALLY ONCE A DAY 90   • gabapentin (NEURONTIN) 300 mg, Oral, 3 times daily   • meloxicam (MOBIC) 15 mg tablet TAKE 1 TABLET ORALLY EVERY DAY TAKE WITH FOOD   • minoxidil (LONITEN) 2.5 mg tablet TAKE 1/4 TABLET DAILY   • MULTIPLE VITAMIN PO 1 tablet, Daily   • omeprazole (PriLOSEC) 40 MG capsule No dose, route, or frequency recorded.   • polyethylene glycol-propylene glycol (Systane) 0.4-0.3 % 1 drop, Ophthalmic, 3 times daily   • Prolia 1 mg, Subcutaneous   • zolpidem (AMBIEN) 5 mg tablet TAKE 1 TABLET BY MOUTH EVERY DAY AT BEDTIME AS NEEDED FOR 30 DAYS       History of Present Illness:   + vaginal itching and burning  for one week. No OTC  besides uristat.  Same partner      Review of Systems:  Review  of Systems   Constitutional: Negative.    Genitourinary:  Positive for dysuria, vaginal discharge and vaginal pain. Negative for difficulty urinating, dyspareunia, enuresis, flank pain, frequency, genital sores and urgency.   Musculoskeletal: Negative.    Psychiatric/Behavioral: Negative.         Physical Exam:  /58 (BP Location: Right arm, Patient Position: Sitting, Cuff Size: Standard)   Ht 5' (1.524 m)   Wt 48.5 kg (107 lb)   BMI 20.90 kg/m²   Physical Exam  Constitutional:       Appearance: Normal appearance.   Genitourinary:      Bladder, rectum and urethral meatus normal.      Genitourinary Comments: Ph 4 neg whiff + hyphae and budding   no clue       Right Labia: No rash, tenderness, lesions or skin changes.     Left Labia: No tenderness, lesions, skin changes or rash.     No inguinal adenopathy present in the right or left side.     No vaginal discharge, erythema, bleeding or ulceration.      No vaginal prolapse present.     Moderate vaginal atrophy present.       Right Adnexa: not tender, not full and no mass present.     Left Adnexa: not tender, not full and no mass present.     No cervical friability.      No urethral prolapse, tenderness, mass, hypermobility or discharge present.      Pelvic Floor: Levator muscle strength is 4/5.     Pelvic floor neuro is intact.     Pelvic exam was performed with patient in the lithotomy position.   Abdominal:      General: Abdomen is flat.      Palpations: Abdomen is soft.      Hernia: There is no hernia in the left inguinal area or right inguinal area.   Lymphadenopathy:      Lower Body: No right inguinal adenopathy. No left inguinal adenopathy.   Neurological:      Mental Status: She is alert.   Vitals and nursing note reviewed.       Point of Care Testing:   - urine neg  did    wet mount  + yeast budding and hyphae    Assessment:   1. Dysuria  and  vaginal yeast    Plan:   Open to  air no under wear to bed at night.  Difucan 150 mg today and repeat in 3  days.  Open  to air  urine  sent for cx.     Reviewed with patient that test results are available in MyChart immediately, but that they will not necessarily be reviewed by me immediately.  Explained that I will review results at my earliest opportunity and contact patient appropriately.

## 2024-06-12 LAB
APPEARANCE UR: CLEAR
BACTERIA UR CULT: NORMAL
BACTERIA URNS QL MICRO: NORMAL
BILIRUB UR QL STRIP: NEGATIVE
CASTS URNS QL MICRO: NORMAL /LPF
COLOR UR: YELLOW
EPI CELLS #/AREA URNS HPF: NORMAL /HPF (ref 0–10)
GLUCOSE UR QL: NEGATIVE
HGB UR QL STRIP: NEGATIVE
KETONES UR QL STRIP: NEGATIVE
LEUKOCYTE ESTERASE UR QL STRIP: NEGATIVE
Lab: NO GROWTH
MICRO URNS: NORMAL
MICRO URNS: NORMAL
NITRITE UR QL STRIP: NEGATIVE
PH UR STRIP: 6.5 [PH] (ref 5–7.5)
PROT UR QL STRIP: NEGATIVE
RBC #/AREA URNS HPF: NORMAL /HPF (ref 0–2)
SP GR UR: 1.01 (ref 1–1.03)
UROBILINOGEN UR STRIP-ACNC: 0.2 MG/DL (ref 0.2–1)
WBC #/AREA URNS HPF: NORMAL /HPF (ref 0–5)

## 2024-06-27 ENCOUNTER — OFFICE VISIT (OUTPATIENT)
Dept: OBGYN CLINIC | Facility: CLINIC | Age: 74
End: 2024-06-27
Payer: MEDICARE

## 2024-06-27 VITALS
HEIGHT: 59 IN | SYSTOLIC BLOOD PRESSURE: 98 MMHG | WEIGHT: 106.2 LBS | DIASTOLIC BLOOD PRESSURE: 60 MMHG | BODY MASS INDEX: 21.41 KG/M2

## 2024-06-27 DIAGNOSIS — N89.8 VAGINAL IRRITATION: ICD-10-CM

## 2024-06-27 DIAGNOSIS — N95.2 ATROPHIC VAGINITIS: ICD-10-CM

## 2024-06-27 DIAGNOSIS — R30.0 DYSURIA: Primary | ICD-10-CM

## 2024-06-27 LAB
BV WHIFF TEST VAG QL: NEGATIVE
CLUE CELLS SPEC QL WET PREP: NEGATIVE
PH SMN: 5 [PH]
SL AMB  POCT GLUCOSE, UA: NEGATIVE
SL AMB LEUKOCYTE ESTERASE,UA: NEGATIVE
SL AMB POCT BILIRUBIN,UA: NEGATIVE
SL AMB POCT BLOOD,UA: NEGATIVE
SL AMB POCT CLARITY,UA: CLEAR
SL AMB POCT COLOR,UA: YELLOW
SL AMB POCT KETONES,UA: NEGATIVE
SL AMB POCT NITRITE,UA: NEGATIVE
SL AMB POCT PH,UA: 5
SL AMB POCT SPECIFIC GRAVITY,UA: 1
SL AMB POCT URINE PROTEIN: NEGATIVE
SL AMB POCT UROBILINOGEN: 0.2
T VAGINALIS VAG QL WET PREP: NEGATIVE
YEAST VAG QL WET PREP: NEGATIVE

## 2024-06-27 PROCEDURE — 99213 OFFICE O/P EST LOW 20 MIN: CPT | Performed by: NURSE PRACTITIONER

## 2024-06-27 PROCEDURE — 87210 SMEAR WET MOUNT SALINE/INK: CPT | Performed by: NURSE PRACTITIONER

## 2024-06-27 PROCEDURE — 81002 URINALYSIS NONAUTO W/O SCOPE: CPT | Performed by: NURSE PRACTITIONER

## 2024-06-27 RX ORDER — ONDANSETRON 4 MG/1
TABLET, ORALLY DISINTEGRATING ORAL
COMMUNITY

## 2024-06-27 RX ORDER — IBUPROFEN 200 MG
800 TABLET ORAL EVERY 6 HOURS PRN
COMMUNITY

## 2024-06-27 RX ORDER — LIDOCAINE 50 MG/G
PATCH TOPICAL EVERY 24 HOURS
COMMUNITY

## 2024-06-27 RX ORDER — IPRATROPIUM BROMIDE 21 UG/1
SPRAY, METERED NASAL EVERY 12 HOURS
COMMUNITY

## 2024-06-27 RX ORDER — GABAPENTIN 300 MG/1
CAPSULE ORAL
COMMUNITY
Start: 2024-06-04

## 2024-06-27 RX ORDER — ESTRADIOL 0.1 MG/G
0.5 CREAM VAGINAL 2 TIMES WEEKLY
Qty: 42.5 G | Refills: 1 | Status: SHIPPED | OUTPATIENT
Start: 2024-06-27

## 2024-06-27 RX ORDER — SENNOSIDES 8.6 MG
650 CAPSULE ORAL EVERY 8 HOURS PRN
COMMUNITY

## 2024-06-27 NOTE — PROGRESS NOTES
[facility] - [address], [address], [place] [address]    Assessment/Plan:  1. Dysuria  -     POCT urine dip  2. Atrophic vaginitis  -     estradiol (ESTRACE VAGINAL) 0.1 mg/g vaginal cream; Insert 0.5 g into the vagina 2 (two) times a week May also apply a small amount externally, two or three times a week  3. Vaginal irritation  -     VAGINOSIS DNA PROBE  -     POCT wet mount  Likely atrophic vaginitis, discussed treatment with topical estrogen  Also recommend coconut oil prn for moisture and comfort  Return visit prn    Subjective:   Maria T Alaniz is a 73 y.o.  female.  CC: irritation, urinary frequency      HPI:   73 year old female presents for office visit with persistent complaints of urinary frequency and vaginal irritation the last several weeks. Pt states symptoms came on gradually and have been worsening.  Treated for yeast with Diflucan earlier this month on 6/10, which did not help her symptoms. She had a negative urine culture at that time. She otherwise feels well and has no additional concerns.         Gyn History  No LMP recorded. Patient is postmenopausal.       Last pap smear: 2022    She  reports being sexually active and has had partner(s) who are male. She reports using the following method of birth control/protection: Post-menopausal.       OB History      Past Medical History:  No date: Shingles     Past Surgical History:  No date: ELBOW SURGERY; Right      Comment:  2023  No date: SHOULDER SURGERY; Right      Comment:  twice     Social History     Tobacco Use    Smoking status: Never    Smokeless tobacco: Never   Vaping Use    Vaping status: Never Used   Substance Use Topics    Alcohol use: Yes     Comment: occasional    Drug use: Never          Current Outpatient Medications:     acetaminophen (TYLENOL) 650 mg CR tablet, Take 650 mg by mouth every 8 (eight) hours as needed, Disp: , Rfl:     Ascorbic Acid 125 MG CHEW, every 24 hours, Disp: , Rfl:      atorvastatin (LIPITOR) 40 mg tablet, , Disp: , Rfl:     Calcium Magnesium Zinc 333-133-5 MG TABS, Take 1 tablet by mouth daily, Disp: , Rfl:     Cholecalciferol 125 MCG (5000 UT) capsule, Take 1 capsule by mouth in the morning., Disp: , Rfl:     denosumab (Prolia) 60 mg/mL, Inject 1 mg under the skin, Disp: , Rfl:     estradiol (ESTRACE VAGINAL) 0.1 mg/g vaginal cream, Insert 0.5 g into the vagina 2 (two) times a week May also apply a small amount externally, two or three times a week, Disp: 42.5 g, Rfl: 1    FIBER ADULT GUMMIES PO, Take by mouth daily, Disp: , Rfl:     fluticasone (FLONASE) 50 mcg/act nasal spray, USE 2 SPRAYS NASALLY ONCE A DAY 90, Disp: , Rfl:     gabapentin (NEURONTIN) 300 mg capsule, , Disp: , Rfl:     ibuprofen (MOTRIN) 200 mg tablet, Take 800 mg by mouth every 6 (six) hours as needed, Disp: , Rfl:     ipratropium (ATROVENT) 0.03 % nasal spray, Every 12 hours, Disp: , Rfl:     lidocaine (LIDODERM) 5 %, every 24 hours, Disp: , Rfl:     minoxidil (LONITEN) 2.5 mg tablet, TAKE 1/4 TABLET DAILY, Disp: , Rfl:     omeprazole (PriLOSEC) 40 MG capsule, , Disp: , Rfl:     ondansetron (ZOFRAN-ODT) 4 mg disintegrating tablet, ALLOW 1 TABLET TO DISSOLVE ON THE TONGUE EVERY 6 HOURS AS NEEDED., Disp: , Rfl:     polyethylene glycol-propylene glycol (Systane) 0.4-0.3 %, Apply 1 drop to eye Three times a day, Disp: , Rfl:     zolpidem (AMBIEN) 5 mg tablet, TAKE 1 TABLET BY MOUTH EVERY DAY AT BEDTIME AS NEEDED FOR 30 DAYS, Disp: , Rfl:     B Complex CAPS, Take 1 capsule by mouth in the morning. 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    ROS: Review of Systems Negative except as noted in HPI    Objective:  BP 98/60 (BP Location: Left arm, Patient Position: Sitting, Cuff Size: Standard)   Ht 4' 10.5\" (1.486 m)   Wt 48.2 kg (106 lb 3.2 oz)   BMI 21.82 kg/m²      Physical Exam  Constitutional:       General: She is not in acute distress.     Appearance: Normal appearance.   Genitourinary:     General: Normal vulva.      Labia:         Right: No lesion.         Left: No lesion.       Urethra: No prolapse or urethral lesion.      Vagina: No vaginal discharge or lesions.      Cervix: No cervical motion tenderness, friability, lesion or cervical bleeding.      Uterus: Normal. Not enlarged and not tender.       Adnexa: Right adnexa normal and left adnexa normal.        Right: No mass or tenderness.          Left: No mass or tenderness.        Comments: Vagina atrophic.Wet mount negative, ph 5.0.  Skin:     General: Skin is warm and dry.   Psychiatric:         Thought Content: Thought content normal.         Judgment: Judgment normal.       In office urine dipstick Negative.  "

## 2024-07-01 ENCOUNTER — TELEPHONE (OUTPATIENT)
Age: 74
End: 2024-07-01

## 2024-07-01 NOTE — TELEPHONE ENCOUNTER
Received call from Renae with [facility]. States that they received an aptima orange tube for the vaginosis testing however the test code they received was for an affirm transport tube. Test not able to be run unless they receive correct test code for the aptima orange. [facility] [phone] and a detailed voicemail can be left.

## 2024-07-24 LAB
A VAGINAE DNA VAG QL NAA+PROBE: NORMAL SCORE
BVAB2 DNA VAG QL NAA+PROBE: NORMAL SCORE
C ALBICANS DNA VAG QL NAA+PROBE: NEGATIVE
C GLABRATA DNA VAG QL NAA+PROBE: NEGATIVE
MEGA1 DNA VAG QL NAA+PROBE: NORMAL SCORE
T VAGINALIS RRNA SPEC QL NAA+PROBE: NEGATIVE

## 2024-12-13 ENCOUNTER — HOSPITAL ENCOUNTER (OUTPATIENT)
Dept: HOSPITAL 99 - HWLAB | Age: 74
End: 2024-12-13
Payer: MEDICARE

## 2024-12-13 DIAGNOSIS — M81.0: Primary | ICD-10-CM

## 2024-12-13 LAB
ALBUMIN SERPL-MCNC: 4.2 G/DL (ref 3.5–5)
ALP SERPL-CCNC: 42 U/L (ref 38–126)
ALT SERPL-CCNC: 36 U/L (ref 0–35)
AST SERPL-CCNC: 39 U/L (ref 14–36)
BUN SERPL-MCNC: 23 MG/DL (ref 7–17)
CALCIUM SERPL-MCNC: 8.8 MG/DL (ref 8.4–10.2)
CHLORIDE SERPL-SCNC: 105 MMOL/L (ref 98–107)
CO2 SERPL-SCNC: 29 MMOL/L (ref 22–30)
CRP SERPL-MCNC: < 5 MG/L (ref 0–10)
EGFR: > 60
GLUCOSE SERPL-MCNC: 91 MG/DL (ref 70–99)
POTASSIUM SERPL-SCNC: 3.9 MMOL/L (ref 3.5–5.1)
PROT SERPL-MCNC: 6.5 G/DL (ref 6.3–8.2)
SODIUM SERPL-SCNC: 141 MMOL/L (ref 135–145)

## 2024-12-30 ENCOUNTER — OFFICE VISIT (OUTPATIENT)
Dept: URGENT CARE | Facility: CLINIC | Age: 74
End: 2024-12-30
Payer: MEDICARE

## 2024-12-30 VITALS
WEIGHT: 108 LBS | HEART RATE: 78 BPM | BODY MASS INDEX: 22.67 KG/M2 | DIASTOLIC BLOOD PRESSURE: 62 MMHG | TEMPERATURE: 98.1 F | HEIGHT: 58 IN | OXYGEN SATURATION: 99 % | SYSTOLIC BLOOD PRESSURE: 138 MMHG | RESPIRATION RATE: 18 BRPM

## 2024-12-30 DIAGNOSIS — J01.00 ACUTE MAXILLARY SINUSITIS, RECURRENCE NOT SPECIFIED: Primary | ICD-10-CM

## 2024-12-30 PROCEDURE — G0463 HOSPITAL OUTPT CLINIC VISIT: HCPCS | Performed by: PHYSICIAN ASSISTANT

## 2024-12-30 PROCEDURE — 99213 OFFICE O/P EST LOW 20 MIN: CPT | Performed by: PHYSICIAN ASSISTANT

## 2024-12-30 RX ORDER — AMOXICILLIN 875 MG/1
875 TABLET, COATED ORAL 2 TIMES DAILY
Qty: 14 TABLET | Refills: 0 | Status: SHIPPED | OUTPATIENT
Start: 2024-12-30 | End: 2025-01-06

## 2024-12-30 NOTE — PROGRESS NOTES
[facility]        NAME: Maria T Alaniz is a 74 y.o. female  : 1950    MRN: [id number]  DATE: 2024  TIME: 2:42 PM    Assessment and Plan   Acute maxillary sinusitis, recurrence not specified [J01.00]  1. Acute maxillary sinusitis, recurrence not specified  amoxicillin (AMOXIL) 875 mg tablet            Patient Instructions     Patient Instructions   Take antibiotic as instructed.  Continue supportive care.  All patient's questions answered.      Follow up with PCP in 3-5 days.  Proceed to  ER if symptoms worsen.    Chief Complaint     Chief Complaint   Patient presents with    Sinusitis     Started 2 weeks ago. Facial pressure. Post nasal drip.          History of Present Illness       Patient is a 74-year-old female presenting today with cold-like symptoms x 2 weeks.  Patient notes over last couple weeks she has had worsening nasal congestion, ear pain and now has some sinus pain/facial pain.  Is experiencing significant pain around her nose and eyes.  Is worried about possible sinus infection.  Has tried several different over-the-counter cough and cold remedies with no relief.  Denies fever, ear discharge or drainage, hearing loss, tinnitus.        Review of Systems   Review of Systems   Constitutional:  Negative for chills, fatigue and fever.   HENT:  Positive for congestion, postnasal drip, rhinorrhea, sinus pressure and sinus pain. Negative for ear pain, sore throat and trouble swallowing.    Eyes:  Negative for pain.   Respiratory:  Negative for cough and shortness of breath.    Gastrointestinal:  Negative for abdominal pain.   Musculoskeletal:  Negative for arthralgias and myalgias.   Skin:  Negative for rash.   Neurological:  Negative for headaches.         Current Medications       Current Outpatient Medications:     acetaminophen (TYLENOL) 650 mg CR tablet, Take 650 mg by mouth every 8 (eight) hours as needed, Disp: , Rfl:     amoxicillin (AMOXIL) 875 mg tablet, Take 1  tablet (875 mg total) by mouth 2 (two) times a day for 7 days, Disp: 14 tablet, Rfl: 0    Ascorbic Acid 125 MG CHEW, every 24 hours, Disp: , Rfl:     atorvastatin (LIPITOR) 40 mg tablet, , Disp: , Rfl:     Calcium Magnesium Zinc 333-133-5 MG TABS, Take 1 tablet by mouth daily, Disp: , Rfl:     Cholecalciferol 125 MCG (5000 UT) capsule, Take 1 capsule by mouth in the morning., Disp: , Rfl:     denosumab (Prolia) 60 mg/mL, Inject 1 mg under the skin, Disp: , Rfl:     ergocalciferol (VITAMIN D2) 50,000 units, Take 50,000 Units by mouth, Disp: , Rfl:     FIBER ADULT GUMMIES PO, Take by mouth daily, Disp: , Rfl:     fluticasone (FLONASE) 50 mcg/act nasal spray, USE 2 SPRAYS NASALLY ONCE A DAY 90, Disp: , Rfl:     gabapentin (NEURONTIN) 300 mg capsule, , Disp: , Rfl:     ibuprofen (MOTRIN) 200 mg tablet, Take 800 mg by mouth every 6 (six) hours as needed, Disp: , Rfl:     lidocaine (LIDODERM) 5 %, every 24 hours, Disp: , Rfl:     omeprazole (PriLOSEC) 40 MG capsule, , Disp: , Rfl:     ondansetron (ZOFRAN-ODT) 4 mg disintegrating tablet, ALLOW 1 TABLET TO DISSOLVE ON THE TONGUE EVERY 6 HOURS AS NEEDED., Disp: , Rfl:     polyethylene glycol-propylene glycol (Systane) 0.4-0.3 %, Apply 1 drop to eye Three times a day, Disp: , Rfl:     zolpidem (AMBIEN) 5 mg tablet, TAKE 1 TABLET BY MOUTH EVERY DAY AT BEDTIME AS NEEDED FOR 30 DAYS, Disp: , Rfl:     B Complex CAPS, Take 1 capsule by mouth in the morning. (Patient not taking: Reported on 12/30/2024), Disp: , Rfl:     estradiol (ESTRACE VAGINAL) 0.1 mg/g vaginal cream, Insert 0.5 g into the vagina 2 (two) times a week May also apply a small amount externally, two or three times a week (Patient not taking: Reported on 12/30/2024), Disp: 42.5 g, Rfl: 1    gabapentin (NEURONTIN) 100 mg capsule, Take 300 mg by mouth 3 (three) times a day (Patient not taking: Reported on 12/30/2024), Disp: , Rfl:     ipratropium (ATROVENT) 0.03 % nasal spray, Every 12 hours (Patient not taking:  "Reported on 12/30/2024), Disp: , Rfl:     meloxicam (MOBIC) 15 mg tablet, TAKE 1 TABLET ORALLY EVERY DAY TAKE WITH FOOD (Patient not taking: Reported on 12/30/2024), Disp: , Rfl:     minoxidil (LONITEN) 2.5 mg tablet, TAKE 1/4 TABLET DAILY (Patient not taking: Reported on 12/30/2024), Disp: , Rfl:     MULTIPLE VITAMIN PO, Take 1 tablet by mouth daily (Patient not taking: Reported on 12/30/2024), Disp: , Rfl:     Current Allergies     Allergies as of 12/30/2024 - Reviewed 12/30/2024   Allergen Reaction Noted    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     Past Medical History:   Diagnosis Date    Shingles        Past Surgical History:   Procedure Laterality Date    ELBOW SURGERY Right     11/2023    SHOULDER SURGERY Right     twice       No family history on file.      Medications have been verified.        Objective   /62   Pulse 78   Temp 98.1 °F (36.7 °C)   Resp 18   Ht 4' 10\" (1.473 m)   Wt 49 kg (108 lb)   SpO2 99%   BMI 22.57 kg/m²        Physical Exam     Physical Exam  Vitals and nursing note reviewed.   Constitutional:       General: She is not in acute distress.     Appearance: Normal appearance.   HENT:      Head: Normocephalic and atraumatic.      Right Ear: Tympanic membrane, ear canal and external ear normal.      Left Ear: Tympanic membrane, ear canal and external ear normal.      Nose: Congestion present.      Right Turbinates: Swollen and pale.      Left Turbinates: Swollen and pale.      Right Sinus: Maxillary sinus tenderness present. No frontal sinus tenderness.      Left Sinus: Maxillary sinus tenderness present. No frontal sinus tenderness.      Mouth/Throat:      Mouth: Mucous membranes are moist.      Pharynx: " Oropharynx is clear.   Eyes:      Conjunctiva/sclera: Conjunctivae normal.      Pupils: Pupils are equal, round, and reactive to light.   Cardiovascular:      Rate and Rhythm: Normal rate and regular rhythm.      Pulses: Normal pulses.      Heart sounds: Normal heart sounds.   Pulmonary:      Effort: Pulmonary effort is normal.      Breath sounds: Normal breath sounds.   Musculoskeletal:      Cervical back: Normal range of motion. No tenderness.   Lymphadenopathy:      Cervical: No cervical adenopathy.   Skin:     General: Skin is warm.      Capillary Refill: Capillary refill takes less than 2 seconds.   Neurological:      General: No focal deficit present.      Mental Status: She is alert.   Psychiatric:         Mood and Affect: Mood normal.

## 2025-01-10 ENCOUNTER — HOSPITAL ENCOUNTER (OUTPATIENT)
Dept: HOSPITAL 99 - HWLAB | Age: 75
End: 2025-01-10
Payer: MEDICARE

## 2025-01-10 DIAGNOSIS — M81.0: ICD-10-CM

## 2025-01-10 DIAGNOSIS — E55.9: Primary | ICD-10-CM

## 2025-01-10 DIAGNOSIS — T50.905A: ICD-10-CM

## 2025-01-10 LAB
25(OH)D3 SERPL-MCNC: 64.1 NG/ML (ref 30–80)
ALBUMIN SERPL-MCNC: 4.5 G/DL (ref 3.5–5)
ALP SERPL-CCNC: 51 U/L (ref 38–126)
ALT SERPL-CCNC: 37 U/L (ref 0–35)
AST SERPL-CCNC: 35 U/L (ref 14–36)
BUN SERPL-MCNC: 29 MG/DL (ref 7–17)
CALCIUM SERPL-MCNC: 8.8 MG/DL (ref 8.4–10.2)
CHLORIDE SERPL-SCNC: 103 MMOL/L (ref 98–107)
CO2 SERPL-SCNC: 27 MMOL/L (ref 22–30)
EGFR: > 60
ERYTHROCYTE [DISTWIDTH] IN BLOOD BY AUTOMATED COUNT: 13 % (ref 11.5–14.5)
GLUCOSE SERPL-MCNC: 98 MG/DL (ref 70–99)
HCT VFR BLD AUTO: 39.1 % (ref 37–47)
HGB BLD-MCNC: 12.6 G/DL (ref 12–16)
MCHC RBC AUTO-ENTMCNC: 32.2 G/DL (ref 33–37)
MCV RBC AUTO: 91.4 FL (ref 81–99)
NRBC BLD AUTO-RTO: 0 %
PLATELET # BLD AUTO: 263 10^3/UL (ref 130–400)
POTASSIUM SERPL-SCNC: 4.5 MMOL/L (ref 3.5–5.1)
PROT SERPL-MCNC: 6.7 G/DL (ref 6.3–8.2)
SODIUM SERPL-SCNC: 139 MMOL/L (ref 135–145)

## 2025-02-28 ENCOUNTER — HOSPITAL ENCOUNTER (OUTPATIENT)
Dept: HOSPITAL 99 - HWLAB | Age: 75
End: 2025-02-28
Payer: MEDICARE

## 2025-02-28 DIAGNOSIS — D72.819: Primary | ICD-10-CM

## 2025-02-28 LAB
ERYTHROCYTE [DISTWIDTH] IN BLOOD BY AUTOMATED COUNT: 13.5 % (ref 11.5–14.5)
HCT VFR BLD AUTO: 38.9 % (ref 37–47)
HGB BLD-MCNC: 12.3 G/DL (ref 12–16)
MCHC RBC AUTO-ENTMCNC: 31.6 G/DL (ref 33–37)
MCV RBC AUTO: 91.5 FL (ref 81–99)
NRBC BLD AUTO-RTO: 0 %
PLATELET # BLD AUTO: 238 10^3/UL (ref 130–400)

## 2025-06-11 ENCOUNTER — ANNUAL EXAM (OUTPATIENT)
Dept: OBGYN CLINIC | Facility: CLINIC | Age: 75
End: 2025-06-11
Payer: MEDICARE

## 2025-06-11 VITALS
BODY MASS INDEX: 23.3 KG/M2 | HEIGHT: 58 IN | DIASTOLIC BLOOD PRESSURE: 64 MMHG | SYSTOLIC BLOOD PRESSURE: 110 MMHG | WEIGHT: 111 LBS

## 2025-06-11 DIAGNOSIS — Z12.31 ENCOUNTER FOR SCREENING MAMMOGRAM FOR MALIGNANT NEOPLASM OF BREAST: ICD-10-CM

## 2025-06-11 DIAGNOSIS — Z01.419 ROUTINE GYNECOLOGICAL EXAMINATION: Primary | ICD-10-CM

## 2025-06-11 PROCEDURE — G0101 CA SCREEN;PELVIC/BREAST EXAM: HCPCS | Performed by: OBSTETRICS & GYNECOLOGY

## 2025-06-11 RX ORDER — IPRATROPIUM BROMIDE 42 UG/1
SPRAY, METERED NASAL
COMMUNITY
Start: 2025-05-22

## 2025-06-27 NOTE — PROGRESS NOTES
[facility] - [address], [address], [place] [address]    ASSESSMENT/PLAN: Maria T Alaniz is a 74 y.o.  who presents for annual gynecologic exam.    Encounter for routine gynecologic examination  - Routine well woman exam completed today.  - Cervical Cancer Screening: Current ASCCP Guidelines reviewed. Last Pap: 2022 . Next Pap Due: n/a  - HPV Vaccination status: Not immunized  - Contraceptive counseling discussed.  Current contraception: none  - Breast Cancer Screening: Last Mammogram 2022, ordered  - Colorectal cancer screening was not ordered. Up to date  - The following were reviewed in today's visit: breast self exam, mammography screening ordered, and menopause    Additional problems addressed during this visit:  1. Routine gynecological examination  2. Encounter for screening mammogram for malignant neoplasm of breast  -     Mammo screening bilateral w 3d and cad; Future      CC:  Annual Gynecologic Examination    HPI: Maria T Alaniz is a 74 y.o.  who presents for annual gynecologic examination.  HPI    The following portions of the patient's history were reviewed and updated as appropriate: She  has a past medical history of Shingles and Temporal arteritis (HCC).  She  has a past surgical history that includes Shoulder surgery (Right); Elbow surgery (Right); and Cataract extraction.  Her family history is not on file.  She  reports that she has never smoked. She has never used smokeless tobacco. She reports current alcohol use. She reports that she does not use drugs.  Current Outpatient Medications   Medication Sig Dispense Refill    acetaminophen (TYLENOL) 650 mg CR tablet Take 650 mg by mouth every 8 (eight) hours as needed      Ascorbic Acid 125 MG CHEW every 24 hours      atorvastatin (LIPITOR) 40 mg tablet       Calcium Magnesium Zinc 333-133-5 MG TABS Take 1 tablet by mouth in the morning.      Cholecalciferol 125 MCG (5000 UT) capsule Take 1  capsule by mouth in the morning.      denosumab (Prolia) 60 mg/mL Inject 1 mg under the skin      FIBER ADULT GUMMIES PO Take by mouth in the morning.      fluticasone (FLONASE) 50 mcg/act nasal spray       gabapentin (NEURONTIN) 300 mg capsule       ibuprofen (MOTRIN) 200 mg tablet Take 800 mg by mouth every 6 (six) hours as needed      ipratropium (ATROVENT) 0.06 % nasal spray USE 2 SPRAYS IN EACH NOSTRIL FOUR TIMES A DAY AS NEEDED      lidocaine (LIDODERM) 5 % every 24 hours      minoxidil (LONITEN) 2.5 mg tablet       omeprazole (PriLOSEC) 40 MG capsule       ondansetron (ZOFRAN-ODT) 4 mg disintegrating tablet       polyethylene glycol-propylene glycol (Systane) 0.4-0.3 % Apply 1 drop to eye in the morning and 1 drop at noon and 1 drop in the evening.      zolpidem (AMBIEN) 5 mg tablet       B Complex CAPS Take 1 capsule by mouth in the morning. (Patient not taking: Reported on 6/10/2024)      ergocalciferol (VITAMIN D2) 50,000 units Take 50,000 Units by mouth (Patient not taking: Reported on 6/11/2025)      estradiol (ESTRACE VAGINAL) 0.1 mg/g vaginal cream Insert 0.5 g into the vagina 2 (two) times a week May also apply a small amount externally, two or three times a week (Patient not taking: Reported on 6/11/2025) 42.5 g 1    gabapentin (NEURONTIN) 100 mg capsule Take 300 mg by mouth 3 (three) times a day (Patient not taking: Reported on 6/11/2025)      ipratropium (ATROVENT) 0.03 % nasal spray Every 12 hours (Patient not taking: Reported on 6/11/2025)      meloxicam (MOBIC) 15 mg tablet TAKE 1 TABLET ORALLY EVERY DAY TAKE WITH FOOD (Patient not taking: Reported on 6/10/2024)      MULTIPLE VITAMIN PO Take 1 tablet by mouth daily (Patient not taking: Reported on 6/10/2024)       No current facility-administered medications for this visit.     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    Review of Systems      Objective:  /64 (BP Location: Left arm, Patient Position: Sitting, Cuff Size:  "Standard)   Ht 4' 10\" (1.473 m)   Wt 50.3 kg (111 lb)   BMI 23.20 kg/m²    Physical Exam      PE:  General Appearance: alert and oriented, in no acute distress.   HEENT: PERRL, thyroid without masses or tenderness  Breast: No masses, tenderness, skin changes, nipple D/C or axillary or supraclavicular adenopathy  Abdomen: Soft, non-tender, non-distended, no masses, no rebound or guarding.  Pelvic:       External genitalia: Normal appearance, no abnormal pigmentation, no lesions or masses. Normal [place]'s [place]'s.      Urinary system: Urethral meatus normal, bladder non-tender.      Vaginal: normal mucosa without prolapse or lesions. Normal-appearing physiologic discharge      Cervix: Normal-appearing, well-epithelialized, no gross lesions or masses No cervical motion tenderness.      Adnexa: No adnexal masses or tenderness noted.      Uterus: Normal-sized, regular contour, midline, mobile, no uterine tenderness.  Extremities: Normal range of motion.   Skin: normal, no rash or abnormalities  Neurologic: alert, oriented x3  Psychiatric: Appropriate affect, mood stable, cooperative with exam.  "

## 2025-07-09 ENCOUNTER — HOSPITAL ENCOUNTER (OUTPATIENT)
Dept: HOSPITAL 99 - HWWDC | Age: 75
End: 2025-07-09
Payer: MEDICARE

## 2025-07-09 DIAGNOSIS — Z12.31: Primary | ICD-10-CM

## 2025-07-15 ENCOUNTER — HOSPITAL ENCOUNTER (OUTPATIENT)
Dept: HOSPITAL 99 - RAD | Age: 75
End: 2025-07-15
Payer: MEDICARE

## 2025-07-15 DIAGNOSIS — R13.14: ICD-10-CM

## 2025-07-15 DIAGNOSIS — R47.02: Primary | ICD-10-CM

## 2025-08-09 ENCOUNTER — OFFICE VISIT (OUTPATIENT)
Dept: URGENT CARE | Facility: CLINIC | Age: 75
End: 2025-08-09
Payer: MEDICARE

## 2025-08-09 ENCOUNTER — APPOINTMENT (OUTPATIENT)
Dept: RADIOLOGY | Facility: CLINIC | Age: 75
End: 2025-08-09
Payer: MEDICARE

## 2025-08-09 VITALS
OXYGEN SATURATION: 98 % | DIASTOLIC BLOOD PRESSURE: 78 MMHG | SYSTOLIC BLOOD PRESSURE: 120 MMHG | HEART RATE: 74 BPM | TEMPERATURE: 98 F | RESPIRATION RATE: 18 BRPM

## 2025-08-09 DIAGNOSIS — S83.92XA SPRAIN OF LEFT KNEE, UNSPECIFIED LIGAMENT, INITIAL ENCOUNTER: Primary | ICD-10-CM

## 2025-08-09 DIAGNOSIS — S89.92XA INJURY OF LEFT KNEE, INITIAL ENCOUNTER: ICD-10-CM

## 2025-08-09 PROCEDURE — 99214 OFFICE O/P EST MOD 30 MIN: CPT

## 2025-08-09 PROCEDURE — G0463 HOSPITAL OUTPT CLINIC VISIT: HCPCS

## 2025-08-15 ENCOUNTER — TELEPHONE (OUTPATIENT)
Age: 75
End: 2025-08-15

## 2025-08-20 ENCOUNTER — OFFICE VISIT (OUTPATIENT)
Age: 75
End: 2025-08-20
Payer: MEDICARE

## 2025-08-20 VITALS — WEIGHT: 111 LBS | BODY MASS INDEX: 23.3 KG/M2 | HEIGHT: 58 IN

## 2025-08-20 DIAGNOSIS — M25.561 RIGHT KNEE PAIN, UNSPECIFIED CHRONICITY: Primary | ICD-10-CM

## 2025-08-20 DIAGNOSIS — S83.92XA SPRAIN OF LEFT KNEE, UNSPECIFIED LIGAMENT, INITIAL ENCOUNTER: ICD-10-CM

## 2025-08-20 DIAGNOSIS — S80.02XA CONTUSION OF LEFT KNEE, INITIAL ENCOUNTER: ICD-10-CM

## 2025-08-20 PROCEDURE — 99213 OFFICE O/P EST LOW 20 MIN: CPT | Performed by: ORTHOPAEDIC SURGERY

## (undated) DEVICE — DRAPE-U-1015

## (undated) DEVICE — HANDLE FRAZIER SUCTION 12FR

## (undated) DEVICE — EVACUATOR CLOSED SUCTION 100C

## (undated) DEVICE — GOWN SIRUS NONRNF RAGLAN XL ST 30/CS

## (undated) DEVICE — NEEDLE BOX CONVENIENCE KIT

## (undated) DEVICE — GLOVE SZ 8.5 LINER PROTEXIS PI BL

## (undated) DEVICE — HOOD T7 PLUS W/PEEL AWAY SHIELD

## (undated) DEVICE — DRESSING COVADERM 2IN X 2IN

## (undated) DEVICE — DRILL BIT 4MM STRIPED

## (undated) DEVICE — DRESSING TRANSPARENT TEGADERM 4X10

## (undated) DEVICE — MANIFOLD FOUR PORT NEPTUNE

## (undated) DEVICE — SHEET DRAPE 40X58 STERILE

## (undated) DEVICE — CAMERA LIGHT COVER STERILE HARMONY-AIR

## (undated) DEVICE — PENEVAC1 NONSTICK SMOKE EVAC

## (undated) DEVICE — VEST STERILE

## (undated) DEVICE — APPLICATOR CHLORAPREP 26ML ORANGE TINT

## (undated) DEVICE — SOLUTION PROV-IODINE .75 OZ

## (undated) DEVICE — HANDPIECE SUCTION INTERPULSE W/BONE CLEANING TIP

## (undated) DEVICE — DRILL TAP 6.5MM

## (undated) DEVICE — STRAP POSITIONING 5X72" ONE PIECE DISP

## (undated) DEVICE — BULB SYRINGE IRRIGATION STERILE

## (undated) DEVICE — PAD GROUND ELECTROSURGICAL W/CORD

## (undated) DEVICE — SUTURE POLYSORB 1 UNDYED 1X30 GS-12

## (undated) DEVICE — SYSTEM LABELING CORRECT MEDICATION

## (undated) DEVICE — DRAPE STERI TOWEL PLASTIC 18X24

## (undated) DEVICE — TIP SUCTION YANKAUER

## (undated) DEVICE — DRESSING TEGADERM 4X4 3/4

## (undated) DEVICE — STIRRUP STRAP DISPOSABLE

## (undated) DEVICE — ADHESIVE SKIN DERMABOND ADVANCED 0.7ML

## (undated) DEVICE — COVER MAYO STAND XLARGE 30¿ X 57¿

## (undated) DEVICE — HANDLE LIGHT COVER STERILE

## (undated) DEVICE — SUTURE BIOSYN 3-0 UNDYED 1X30 P-14

## (undated) DEVICE — SUTURE POLYSORB 2-0 UNDYED 1X30 GS-10

## (undated) DEVICE — DRAPE POUCH IRRIGATION

## (undated) DEVICE — ARM CRADLES

## (undated) DEVICE — FACEMASK FOR SHOULDER POSITONER

## (undated) DEVICE — SURGICEL 2X14

## (undated) DEVICE — KIT SHOULDER STABILIZATION SPIDER

## (undated) DEVICE — GLOVE PROTEXIS PI ORTHO 8.5

## (undated) DEVICE — PACK MLHS SHOULDER PACK RFID STDZ

## (undated) DEVICE — TOOMEY SYRINGE, 70CC STERILE

## (undated) DEVICE — FIBERWIRE #2 W/TAPERED NEEDLE

## (undated) DEVICE — PACK SET UP NO DRAPE 12/CS

## (undated) DEVICE — DRAIN SINGLE ROUND SIL W/TROCAR 1/8

## (undated) DEVICE — DRAPE IOBAN